# Patient Record
Sex: MALE | Race: WHITE | Employment: OTHER | URBAN - NONMETROPOLITAN AREA
[De-identification: names, ages, dates, MRNs, and addresses within clinical notes are randomized per-mention and may not be internally consistent; named-entity substitution may affect disease eponyms.]

---

## 2017-01-11 ENCOUNTER — OFFICE VISIT - GICH (OUTPATIENT)
Dept: INTERNAL MEDICINE | Facility: OTHER | Age: 82
End: 2017-01-11

## 2017-01-11 ENCOUNTER — HISTORY (OUTPATIENT)
Dept: INTERNAL MEDICINE | Facility: OTHER | Age: 82
End: 2017-01-11

## 2017-01-11 DIAGNOSIS — G30.1 ALZHEIMER'S DISEASE WITH LATE ONSET (CODE) (H): ICD-10-CM

## 2017-01-11 DIAGNOSIS — D62 ACUTE POSTHEMORRHAGIC ANEMIA: ICD-10-CM

## 2017-01-11 DIAGNOSIS — M1A.09X1 IDIOPATHIC CHRONIC GOUT OF MULTIPLE SITES WITH TOPHUS: ICD-10-CM

## 2017-01-11 DIAGNOSIS — F02.818 DEMENTIA IN OTHER DISEASES CLASSIFIED ELSEWHERE WITH BEHAVIORAL DISTURBANCE: ICD-10-CM

## 2017-01-11 LAB
HEMOGLOBIN: 13.4 G/DL (ref 13.5–17.5)
MCV RBC AUTO: 83 FL (ref 80–100)

## 2017-03-06 ENCOUNTER — COMMUNICATION - GICH (OUTPATIENT)
Dept: INTERNAL MEDICINE | Facility: OTHER | Age: 82
End: 2017-03-06

## 2017-03-06 DIAGNOSIS — H10.9 CONJUNCTIVITIS: ICD-10-CM

## 2017-04-14 ENCOUNTER — COMMUNICATION - GICH (OUTPATIENT)
Dept: INTERNAL MEDICINE | Facility: OTHER | Age: 82
End: 2017-04-14

## 2017-04-17 ENCOUNTER — HISTORY (OUTPATIENT)
Dept: INTERNAL MEDICINE | Facility: OTHER | Age: 82
End: 2017-04-17

## 2017-04-17 ENCOUNTER — OFFICE VISIT - GICH (OUTPATIENT)
Dept: INTERNAL MEDICINE | Facility: OTHER | Age: 82
End: 2017-04-17

## 2017-04-17 DIAGNOSIS — M1A.09X1 IDIOPATHIC CHRONIC GOUT OF MULTIPLE SITES WITH TOPHUS: ICD-10-CM

## 2017-05-08 ENCOUNTER — COMMUNICATION - GICH (OUTPATIENT)
Dept: INTERNAL MEDICINE | Facility: OTHER | Age: 82
End: 2017-05-08

## 2017-05-08 DIAGNOSIS — F02.818 DEMENTIA IN OTHER DISEASES CLASSIFIED ELSEWHERE WITH BEHAVIORAL DISTURBANCE: ICD-10-CM

## 2017-05-08 DIAGNOSIS — G30.1 ALZHEIMER'S DISEASE WITH LATE ONSET (CODE) (H): ICD-10-CM

## 2017-07-25 ENCOUNTER — AMBULATORY - GICH (OUTPATIENT)
Dept: SCHEDULING | Facility: OTHER | Age: 82
End: 2017-07-25

## 2017-07-27 ENCOUNTER — COMMUNICATION - GICH (OUTPATIENT)
Dept: INTERNAL MEDICINE | Facility: OTHER | Age: 82
End: 2017-07-27

## 2017-07-27 DIAGNOSIS — F02.818 DEMENTIA OF THE ALZHEIMER'S TYPE WITH EARLY ONSET WITH BEHAVIORAL DISTURBANCE (H): Primary | ICD-10-CM

## 2017-07-27 DIAGNOSIS — G47.00 INSOMNIA: ICD-10-CM

## 2017-07-27 DIAGNOSIS — F32.9 MAJOR DEPRESSIVE DISORDER, SINGLE EPISODE: ICD-10-CM

## 2017-07-27 DIAGNOSIS — G30.0 DEMENTIA OF THE ALZHEIMER'S TYPE WITH EARLY ONSET WITH BEHAVIORAL DISTURBANCE (H): Primary | ICD-10-CM

## 2017-07-31 NOTE — TELEPHONE ENCOUNTER
Risperdal  Last office visit: 4/15/16  Last refill: 6/27/16 #360, 1 R.  Last sig: Give 2 tablets twice a day by mouth.  May give an additional 1 pill as needed mid day or late evening if he is showing agitation and aggression.  New sig: Take 1 tablet twice daily.  Please clarify.  Thank you.

## 2017-08-02 RX ORDER — RISPERIDONE 0.5 MG/1
TABLET ORAL
Qty: 60 TABLET | Refills: 0 | Status: SHIPPED | OUTPATIENT
Start: 2017-08-02 | End: 2017-08-24

## 2017-08-24 DIAGNOSIS — F02.818 DEMENTIA OF THE ALZHEIMER'S TYPE WITH EARLY ONSET WITH BEHAVIORAL DISTURBANCE (H): ICD-10-CM

## 2017-08-24 DIAGNOSIS — G30.0 DEMENTIA OF THE ALZHEIMER'S TYPE WITH EARLY ONSET WITH BEHAVIORAL DISTURBANCE (H): ICD-10-CM

## 2017-08-24 RX ORDER — RISPERIDONE 0.5 MG/1
TABLET ORAL
Qty: 60 TABLET | Refills: 0 | Status: SHIPPED | OUTPATIENT
Start: 2017-08-24 | End: 2018-01-01

## 2017-09-11 ENCOUNTER — OFFICE VISIT - GICH (OUTPATIENT)
Dept: INTERNAL MEDICINE | Facility: OTHER | Age: 82
End: 2017-09-11

## 2017-09-11 ENCOUNTER — HISTORY (OUTPATIENT)
Dept: INTERNAL MEDICINE | Facility: OTHER | Age: 82
End: 2017-09-11

## 2017-09-11 DIAGNOSIS — G47.00 INSOMNIA: ICD-10-CM

## 2017-09-11 DIAGNOSIS — M1A.09X1 IDIOPATHIC CHRONIC GOUT OF MULTIPLE SITES WITH TOPHUS: ICD-10-CM

## 2017-09-11 DIAGNOSIS — G30.1 ALZHEIMER'S DISEASE WITH LATE ONSET (CODE) (H): ICD-10-CM

## 2017-09-11 DIAGNOSIS — F32.9 MAJOR DEPRESSIVE DISORDER, SINGLE EPISODE: ICD-10-CM

## 2017-09-11 DIAGNOSIS — F02.818 DEMENTIA IN OTHER DISEASES CLASSIFIED ELSEWHERE WITH BEHAVIORAL DISTURBANCE: ICD-10-CM

## 2017-09-11 DIAGNOSIS — K92.2 GASTROINTESTINAL HEMORRHAGE: ICD-10-CM

## 2017-09-11 LAB
ANION GAP - HISTORICAL: 14 (ref 5–18)
BUN SERPL-MCNC: 17 MG/DL (ref 7–25)
BUN/CREAT RATIO - HISTORICAL: 15
CALCIUM SERPL-MCNC: 8.8 MG/DL (ref 8.6–10.3)
CHLORIDE SERPLBLD-SCNC: 103 MMOL/L (ref 98–107)
CO2 SERPL-SCNC: 22 MMOL/L (ref 21–31)
CREAT SERPL-MCNC: 1.13 MG/DL (ref 0.7–1.3)
GFR IF NOT AFRICAN AMERICAN - HISTORICAL: >60 ML/MIN/1.73M2
GLUCOSE SERPL-MCNC: 85 MG/DL (ref 70–105)
HEMOGLOBIN: 12.1 G/DL (ref 13.5–17.5)
MCV RBC AUTO: 90 FL (ref 80–100)
POTASSIUM SERPL-SCNC: 4.2 MMOL/L (ref 3.5–5.1)
SODIUM SERPL-SCNC: 139 MMOL/L (ref 133–143)

## 2017-09-25 ENCOUNTER — AMBULATORY - GICH (OUTPATIENT)
Dept: INTERNAL MEDICINE | Facility: OTHER | Age: 82
End: 2017-09-25

## 2017-09-25 DIAGNOSIS — B35.6 TINEA CRURIS: ICD-10-CM

## 2017-10-23 ENCOUNTER — COMMUNICATION - GICH (OUTPATIENT)
Dept: INTERNAL MEDICINE | Facility: OTHER | Age: 82
End: 2017-10-23

## 2017-10-23 DIAGNOSIS — M10.062: ICD-10-CM

## 2017-11-15 ENCOUNTER — HISTORY (OUTPATIENT)
Dept: INTERNAL MEDICINE | Facility: OTHER | Age: 82
End: 2017-11-15

## 2017-11-15 ENCOUNTER — OFFICE VISIT - GICH (OUTPATIENT)
Dept: INTERNAL MEDICINE | Facility: OTHER | Age: 82
End: 2017-11-15

## 2017-11-15 DIAGNOSIS — N48.1 BALANITIS: ICD-10-CM

## 2017-11-15 DIAGNOSIS — G47.00 INSOMNIA: ICD-10-CM

## 2017-12-05 ENCOUNTER — COMMUNICATION - GICH (OUTPATIENT)
Dept: INTERNAL MEDICINE | Facility: OTHER | Age: 82
End: 2017-12-05

## 2017-12-05 DIAGNOSIS — G30.1 ALZHEIMER'S DISEASE WITH LATE ONSET (CODE) (H): ICD-10-CM

## 2017-12-05 DIAGNOSIS — F02.818 DEMENTIA IN OTHER DISEASES CLASSIFIED ELSEWHERE WITH BEHAVIORAL DISTURBANCE: ICD-10-CM

## 2017-12-19 ENCOUNTER — COMMUNICATION - GICH (OUTPATIENT)
Dept: INTERNAL MEDICINE | Facility: OTHER | Age: 82
End: 2017-12-19

## 2017-12-19 DIAGNOSIS — G30.1 ALZHEIMER'S DISEASE WITH LATE ONSET (CODE) (H): ICD-10-CM

## 2017-12-19 DIAGNOSIS — F02.818 DEMENTIA IN OTHER DISEASES CLASSIFIED ELSEWHERE WITH BEHAVIORAL DISTURBANCE: ICD-10-CM

## 2017-12-20 ENCOUNTER — COMMUNICATION - GICH (OUTPATIENT)
Dept: INTERNAL MEDICINE | Facility: OTHER | Age: 82
End: 2017-12-20

## 2017-12-26 ENCOUNTER — AMBULATORY - GICH (OUTPATIENT)
Dept: SCHEDULING | Facility: OTHER | Age: 82
End: 2017-12-26

## 2017-12-28 NOTE — PROGRESS NOTES
Patient Information     Patient Name MRN Sex Angelo Rowland [id number] Male 3/6/1928      Progress Notes by Chip Garcia MD at 11/15/2017  4:00 PM     Author:  Chip Garcia MD Service:  (none) Author Type:  Physician     Filed:  11/15/2017  4:29 PM Encounter Date:  11/15/2017 Status:  Signed     :  Chip Garcia MD (Physician)            SUBJECTIVE:    Angelo Santizo is a 89 y.o. male who presents for trouble sleeping and trouble with probable yeast infection.    HPI Comments: He is in today from his assisted living. He has irritation at the end of his penis. He did have a groin infection consistent with tinea and they have been using nystatin powder on that area. He doesn't offer any real complaints of this. He is uncircumcised.    He has also been up and wandering quite a bit since his wife  a couple of weeks ago. Daughter brings him in today and wonders about other sleep aids that are safe. We talked about various options. She is interested in trying melatonin which seems reasonable.      Allergies     Allergen  Reactions     Nsaids (Non-Steroidal Anti-Inflammatory Drug) GI Bleeding   ,   Current Outpatient Prescriptions     Medication  Sig     acetaminophen (TYLENOL) 325 mg tablet Take 1-2 tablets by mouth every 6 hours if needed (PAIN). Max acetaminophen dose: 4000mg in 24 hrs.     allopurinol (ZYLOPRIM) 100 mg tablet Take 1 tablet by mouth once daily.     colchicine 0.6 mg tablet Take twice a day as needed for gout     melatonin 3 mg tablet Take 1 tablet by mouth at bedtime.     Nystatin, Topical, 1 billion unit powd As directed 3 times daily if needed for Other (Specify) (Rash).     risperiDONE (RISPERDAL) 0.25 mg tablet TAKE 1 TAB BY MOUTH EVERY 8 HOURS AS NEEDED FOR AGITATION     risperiDONE (RISPERDAL) 0.5 mg tablet Take 1 tablet by mouth 2 times daily.     sertraline (ZOLOFT) 50 mg tablet Take 1.5 tablets by mouth once daily.     traZODone (DESYREL) 100 mg tablet Take 1 tablet by mouth  at bedtime.     No current facility-administered medications for this visit.      Medications have been reviewed by me and are current to the best of my knowledge and ability. ,   Past Medical History:     Diagnosis  Date     Alzheimer's dementia      Depression      GI bleed due to NSAIDs      Gout      Insomnia     and   Patient Active Problem List      Diagnosis Date Noted     Chronic idiopathic gout of multiple sites 10/04/2016     Acute upper GI bleed 09/02/2016     Alzheimer's dementia      Depression      Insomnia        REVIEW OF SYSTEMS:  Review of Systems   All other systems reviewed and are negative.      OBJECTIVE:  /70  Pulse 64  Temp 97.6  F (36.4  C) (Tympanic)  Wt 90.3 kg (199 lb)  BMI 29.39 kg/m2    EXAM:   Physical Exam   Constitutional: He is well-developed, well-nourished, and in no distress. No distress.   Genitourinary:   Genitourinary Comments: Uncircumcised. Retraction of the foreskin reveals erythema and moist skin drainage consistent with balanitis   Skin: Skin is warm and dry. He is not diaphoretic.   Nursing note and vitals reviewed.      ASSESSMENT/PLAN:    ICD-10-CM    1. Balanitis N48.1    2. Insomnia, unspecified type G47.00 melatonin 3 mg tablet        Plan:  They will use the nystatin powder that they have 3 times a day. Foreskin needs to be retracted when this is used. Notify if not improving. Trial of melatonin for sleep at 3 mg daily at bedtime. We talked about other medications as well but want to avoid anything which might cause sedation or side effects. Follow-up will be dependent on how things go.

## 2017-12-28 NOTE — TELEPHONE ENCOUNTER
Patient Information     Patient Name MRN Angelo Natarajan [id number] Male 3/6/1928      Telephone Encounter by Cydney Roberson RN at 10/23/2017  2:46 PM     Author:  Cydney Roberson RN Service:  (none) Author Type:  NURS- Registered Nurse     Filed:  10/23/2017  2:50 PM Encounter Date:  10/23/2017 Status:  Signed     :  Cydney Roberson RN (NURS- Registered Nurse)            Tylenol  Office visit in the past 12 months or per provider note.    Last visit with CHULA BELCHER was on: 2017 in [facility]  Next visit with CHULA BELCHER is on: No future appointment listed with this provider  Next visit with Internal Medicine is on: No future appointment listed in this department    Max refill for 12 months from last office visit or per provider note.  Prescription refilled per RN Medication Refill Policy.................... CYDNEY ROBERSON RN ....................  10/23/2017   2:48 PM

## 2017-12-28 NOTE — PROGRESS NOTES
Patient Information     Patient Name MRN Sex Angelo Rowland [id number] Male 3/6/1928      Progress Notes by Chip Garcia MD at 2017 12:40 PM     Author:  Chip Garcia MD Service:  (none) Author Type:  Physician     Filed:  2017  1:32 PM Encounter Date:  2017 Status:  Signed     :  Chip Garcia MD (Physician)            SUBJECTIVE:    Angelo Santizo is a 89 y.o. male who presents for comprehensive review of their multiple medical problems and review of medications, renewal of medications and update on necessary health maintenance issues.      HPI Comments: This patient comes in today for a yearly review. He has had a gentle decline in his mental functioning over the last year. He does have dementia. He is brought in today by his daughter who has joint power of  with his son lives in [place]. She states that the patient has been relatively stable other than his overall decline, as to be expected considering his diagnosis of dementia. His behaviors have been reasonable. His sleep has been reasonable. His gait is getting a little bit more unsteady. He has developed some urinary incontinence. His gout has not been terribly problematic, he takes colchicine on an as-needed basis for that.    He was hospitalized earlier within the acute upper GI bleed. This was treated and everything has been stable since. It's been a while since he had a hemoglobin so I recommended that we do some basic lab work, patient's daughter is in agreement with this. Otherwise no other complaints or concerns. Medications are reconciled. Past medical history, past surgical history, family history and social history are all updated today.      Allergies     Allergen  Reactions     Nsaids (Non-Steroidal Anti-Inflammatory Drug) GI Bleeding   ,   Current Outpatient Prescriptions     Medication  Sig     acetaminophen (TYLENOL) 325 mg tablet Take 1-2 tablets by mouth every 6 hours if needed (PAIN). Max  acetaminophen dose: 4000mg in 24 hrs.     allopurinol (ZYLOPRIM) 100 mg tablet Take 1 tablet by mouth once daily.     colchicine 0.6 mg tablet Take twice a day as needed for gout     risperiDONE (RISPERDAL) 0.25 mg tablet TAKE 1 TAB BY MOUTH EVERY 8 HOURS AS NEEDED FOR AGITATION     risperiDONE (RISPERDAL) 0.5 mg tablet Take 1 tablet by mouth 2 times daily.     sertraline (ZOLOFT) 50 mg tablet Take 1.5 tablets by mouth once daily.     traZODone (DESYREL) 100 mg tablet Take 1 tablet by mouth at bedtime.     No current facility-administered medications for this visit.      Medications have been reviewed by me and are current to the best of my knowledge and ability. ,   Past Medical History:     Diagnosis  Date     Alzheimer's dementia      Depression      GI bleed due to NSAIDs      Gout      Insomnia    ,   Patient Active Problem List      Diagnosis Date Noted     Chronic idiopathic gout of multiple sites 10/04/2016     Acute upper GI bleed 2016     Alzheimer's dementia      Depression      Insomnia    , No past surgical history on file. and   Social History      Substance Use Topics        Smoking status:  Former Smoker     Types: Cigarettes     Quit date: 1985     Smokeless tobacco:  Former User     Types: Chew     Quit date: 2016     Alcohol use  No     Family Status     Relation  Status     Brother Alive    Lewy body dementia      Other     10-12 others       Mother      Father      Social History     Social History        Marital status:       Spouse name: N/A     Number of children:  N/A     Years of education:  N/A     Social History Main Topics        Smoking status:  Former Smoker     Types: Cigarettes     Quit date: 1985     Smokeless tobacco:  Former User     Types: Chew     Quit date: 2016     Alcohol use  No     Drug use:  No     Sexual activity:  Not Asked     Other Topics  Concern     None      Social History Narrative     , retired  "from iron work, 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  Moved to memory care at [facility] with wife.  From [place] 2016.       REVIEW OF SYSTEMS:  Review of Systems   Constitutional: Negative for chills, diaphoresis, fever, malaise/fatigue and weight loss.   HENT: Negative for congestion, ear pain, nosebleeds, sore throat and tinnitus.    Eyes: Negative for blurred vision, double vision, photophobia, pain, discharge and redness.   Respiratory: Negative for cough, hemoptysis, sputum production, shortness of breath and wheezing.    Cardiovascular: Negative for chest pain, palpitations, orthopnea, claudication, leg swelling and PND.   Gastrointestinal: Negative for abdominal pain, blood in stool, constipation, diarrhea, heartburn, nausea and vomiting.   Genitourinary: Negative for dysuria, flank pain and hematuria.   Musculoskeletal: Negative for back pain, joint pain, myalgias and neck pain.   Skin: Negative for itching and rash.   Neurological: Negative for dizziness, tingling, tremors, speech change, loss of consciousness, weakness and headaches.   Psychiatric/Behavioral: Positive for memory loss. Negative for depression, hallucinations, substance abuse and suicidal ideas. The patient has insomnia. The patient is not nervous/anxious.        OBJECTIVE:  /62  Pulse 76  Ht 1.753 m (5' 9\")  Wt 89.8 kg (198 lb)  BMI 29.24 kg/m2    EXAM:   Physical Exam   Constitutional: He is well-developed, well-nourished, and in no distress. No distress.   HENT:   Head: Normocephalic.   Right Ear: External ear normal.   Left Ear: External ear normal.   Mouth/Throat: Oropharynx is clear and moist. No oropharyngeal exudate.   Eyes: Pupils are equal, round, and reactive to light.   Neck: Normal range of motion. Neck supple. No JVD present. No tracheal deviation present. No thyromegaly present.   Cardiovascular: Normal rate and regular rhythm.    Murmur heard.   Systolic murmur is present with a grade of 2/6   RUSB "   Pulmonary/Chest: Effort normal and breath sounds normal. No respiratory distress. He has no wheezes. He has no rales.   Abdominal: Soft. Bowel sounds are normal. He exhibits no distension and no mass. There is no tenderness. There is no rebound and no guarding.   Musculoskeletal: He exhibits edema.   2+ right, 1+ left   Lymphadenopathy:     He has no cervical adenopathy.   Neurological: He is alert.   Skin: Skin is warm and dry. He is not diaphoretic.   Psychiatric:   Significant dementia, pleasant   Nursing note and vitals reviewed.      ASSESSMENT/PLAN:    ICD-10-CM    1. Late onset Alzheimer's disease with behavioral disturbance G30.1 risperiDONE (RISPERDAL) 0.5 mg tablet     F02.81 DISCONTINUED: risperiDONE (RISPERDAL) 0.5 mg tablet   2. Insomnia, unspecified type G47.00 traZODone (DESYREL) 100 mg tablet   3. Idiopathic chronic gout of multiple sites with tophus M1A.09X1 BASIC METABOLIC PANEL      allopurinol (ZYLOPRIM) 100 mg tablet      BASIC METABOLIC PANEL   4. Acute upper GI bleed K92.2 HEMOGLOBIN      HEMOGLOBIN   5. Depression, unspecified depression type F32.9 sertraline (ZOLOFT) 50 mg tablet        Plan: Overall, he seems to be doing fine for his age and his medical diagnoses. Obviously his bench is progressive but for the present time everything seems to be fine and stable so no further changes or additions to medications will be done. All other health measures are basically up-to-date given his age and state of health. Basic lab drawn today and pending, I will send a letter with the results. Follow-up will be on an as-needed basis. Medications are refilled today without change.

## 2017-12-28 NOTE — TELEPHONE ENCOUNTER
Patient Information     Patient Name MRN Sex Angelo Rowland [id number] Male 3/6/1928      Telephone Encounter by Jason Kearns RN at 2017  8:11 AM     Author:  Jason Kearns RN Service:  (none) Author Type:  NURS- Registered Nurse     Filed:  2017  8:13 AM Encounter Date:  2017 Status:  Signed     :  Jason Kearns RN (NURS- Registered Nurse)            Refilled 17.  Unable to complete prescription refill per RN Medication Refill Policy.................... JASON KEARNS RN ....................  2017   8:11 AM

## 2017-12-30 NOTE — NURSING NOTE
Patient Information     Patient Name MRN Sex Angelo Rowland [id number] Male 3/6/1928      Nursing Note by Carole Keen LPN at 2017 12:40 PM     Author:  Carole Keen LPN Service:  (none) Author Type:  NURS- Licensed Practical Nurse     Filed:  2017  1:04 PM Encounter Date:  2017 Status:  Signed     :  Carole Keen LPN (NURS- Licensed Practical Nurse)            The patient is here today to have a annual check up. The patient has not been to the eye doctor in the last year.  Carole Keen LPN......2017  12:59 PM

## 2017-12-30 NOTE — NURSING NOTE
Patient Information     Patient Name MRN Angelo Natarajan [id number] Male 3/6/1928      Nursing Note by Carole Keen LPN at 11/15/2017  4:00 PM     Author:  Carole Keen LPN Service:  (none) Author Type:  NURS- Licensed Practical Nurse     Filed:  11/15/2017  4:12 PM Encounter Date:  11/15/2017 Status:  Signed     :  Carole Keen LPN (NURS- Licensed Practical Nurse)            The patient is here today to seek some kind of sleep aide, as well as the patient recently had discharge, redness and irritation at the tip of his penis.  Caorle Keen LPN......11/15/2017  4:09 PM

## 2018-01-01 ENCOUNTER — OFFICE VISIT (OUTPATIENT)
Dept: INTERNAL MEDICINE | Facility: OTHER | Age: 83
End: 2018-01-01
Attending: INTERNAL MEDICINE
Payer: MEDICARE

## 2018-01-01 ENCOUNTER — TELEPHONE (OUTPATIENT)
Dept: FAMILY MEDICINE | Facility: OTHER | Age: 83
End: 2018-01-01

## 2018-01-01 ENCOUNTER — TELEPHONE (OUTPATIENT)
Dept: INTERNAL MEDICINE | Facility: OTHER | Age: 83
End: 2018-01-01

## 2018-01-01 VITALS
BODY MASS INDEX: 29.98 KG/M2 | HEART RATE: 72 BPM | DIASTOLIC BLOOD PRESSURE: 64 MMHG | WEIGHT: 203 LBS | SYSTOLIC BLOOD PRESSURE: 116 MMHG

## 2018-01-01 VITALS
DIASTOLIC BLOOD PRESSURE: 74 MMHG | BODY MASS INDEX: 29.68 KG/M2 | HEART RATE: 68 BPM | WEIGHT: 201 LBS | SYSTOLIC BLOOD PRESSURE: 132 MMHG

## 2018-01-01 DIAGNOSIS — F03.918 SENILE DEMENTIA, WITH BEHAVIORAL DISTURBANCE (H): ICD-10-CM

## 2018-01-01 DIAGNOSIS — B35.6 TINEA CRURIS: ICD-10-CM

## 2018-01-01 DIAGNOSIS — F02.80 LATE ONSET ALZHEIMER'S DISEASE WITHOUT BEHAVIORAL DISTURBANCE (H): ICD-10-CM

## 2018-01-01 DIAGNOSIS — G47.00 INSOMNIA, UNSPECIFIED TYPE: Primary | ICD-10-CM

## 2018-01-01 DIAGNOSIS — F03.918 SENILE DEMENTIA, WITH BEHAVIORAL DISTURBANCE (H): Primary | ICD-10-CM

## 2018-01-01 DIAGNOSIS — F32.A DEPRESSION, UNSPECIFIED DEPRESSION TYPE: ICD-10-CM

## 2018-01-01 DIAGNOSIS — F02.80 ALZHEIMER'S DEMENTIA (H): ICD-10-CM

## 2018-01-01 DIAGNOSIS — G30.9 ALZHEIMER'S DEMENTIA (H): ICD-10-CM

## 2018-01-01 DIAGNOSIS — F02.80 LATE ONSET ALZHEIMER'S DISEASE WITHOUT BEHAVIORAL DISTURBANCE (H): Primary | ICD-10-CM

## 2018-01-01 DIAGNOSIS — R30.0 DYSURIA: Primary | ICD-10-CM

## 2018-01-01 DIAGNOSIS — F02.818 LATE ONSET ALZHEIMER'S DISEASE WITH BEHAVIORAL DISTURBANCE (H): ICD-10-CM

## 2018-01-01 DIAGNOSIS — M1A.09X0 IDIOPATHIC CHRONIC GOUT OF MULTIPLE SITES WITHOUT TOPHUS: ICD-10-CM

## 2018-01-01 DIAGNOSIS — G30.1 LATE ONSET ALZHEIMER'S DISEASE WITH BEHAVIORAL DISTURBANCE (H): ICD-10-CM

## 2018-01-01 DIAGNOSIS — F51.01 PRIMARY INSOMNIA: ICD-10-CM

## 2018-01-01 DIAGNOSIS — G30.1 LATE ONSET ALZHEIMER'S DISEASE WITHOUT BEHAVIORAL DISTURBANCE (H): Primary | ICD-10-CM

## 2018-01-01 DIAGNOSIS — R60.0 PERIPHERAL EDEMA: Primary | ICD-10-CM

## 2018-01-01 DIAGNOSIS — G30.1 LATE ONSET ALZHEIMER'S DISEASE WITHOUT BEHAVIORAL DISTURBANCE (H): ICD-10-CM

## 2018-01-01 LAB
ALBUMIN UR-MCNC: NEGATIVE MG/DL
APPEARANCE UR: CLEAR
BILIRUB UR QL STRIP: NEGATIVE
COLOR UR AUTO: YELLOW
GLUCOSE UR STRIP-MCNC: NEGATIVE MG/DL
HGB UR QL STRIP: NEGATIVE
KETONES UR STRIP-MCNC: NEGATIVE MG/DL
LEUKOCYTE ESTERASE UR QL STRIP: NEGATIVE
NITRATE UR QL: NEGATIVE
PH UR STRIP: 6 PH (ref 5–9)
SOURCE: NORMAL
SP GR UR STRIP: 1.02 (ref 1–1.03)
UROBILINOGEN UR STRIP-ACNC: 0.2 EU/DL (ref 0.2–1)

## 2018-01-01 PROCEDURE — G0463 HOSPITAL OUTPT CLINIC VISIT: HCPCS

## 2018-01-01 PROCEDURE — 99215 OFFICE O/P EST HI 40 MIN: CPT | Performed by: INTERNAL MEDICINE

## 2018-01-01 PROCEDURE — 99213 OFFICE O/P EST LOW 20 MIN: CPT | Performed by: INTERNAL MEDICINE

## 2018-01-01 PROCEDURE — 81003 URINALYSIS AUTO W/O SCOPE: CPT | Performed by: INTERNAL MEDICINE

## 2018-01-01 RX ORDER — RISPERIDONE 0.5 MG/1
TABLET ORAL
Qty: 270 TABLET | Refills: 1 | Status: SHIPPED | OUTPATIENT
Start: 2018-01-01 | End: 2018-01-01

## 2018-01-01 RX ORDER — RISPERIDONE 0.25 MG/1
0.5 TABLET ORAL EVERY 6 HOURS PRN
Qty: 60 TABLET | Refills: 5 | Status: SHIPPED | OUTPATIENT
Start: 2018-01-01 | End: 2019-01-01

## 2018-01-01 RX ORDER — RISPERIDONE 0.5 MG/1
TABLET ORAL
Qty: 180 TABLET | Refills: 1 | Status: SHIPPED | OUTPATIENT
Start: 2018-01-01 | End: 2018-01-01

## 2018-01-01 RX ORDER — FUROSEMIDE 20 MG
20 TABLET ORAL DAILY
Qty: 30 TABLET | Refills: 11 | Status: SHIPPED | OUTPATIENT
Start: 2018-01-01

## 2018-01-01 RX ORDER — MICONAZOLE NITRATE
POWDER (GRAM) MISCELLANEOUS 2 TIMES DAILY PRN
Qty: 100 G | Refills: 3 | Status: SHIPPED | OUTPATIENT
Start: 2018-01-01 | End: 2019-01-01

## 2018-01-01 RX ORDER — TRAZODONE HYDROCHLORIDE 100 MG/1
TABLET ORAL
Qty: 90 TABLET | Refills: 2 | Status: SHIPPED | OUTPATIENT
Start: 2018-01-01 | End: 2019-01-01

## 2018-01-01 RX ORDER — RISPERIDONE 0.5 MG/1
TABLET ORAL
Qty: 180 TABLET | Refills: 1 | COMMUNITY
Start: 2018-01-01 | End: 2019-01-01

## 2018-01-01 RX ORDER — RISPERIDONE 0.5 MG/1
TABLET ORAL
Qty: 1 TABLET | OUTPATIENT
Start: 2018-01-01

## 2018-01-01 RX ORDER — RISPERIDONE 0.5 MG/1
TABLET ORAL
Qty: 180 TABLET | Refills: 1 | COMMUNITY
Start: 2018-01-01 | End: 2018-01-01

## 2018-01-01 RX ORDER — LANOLIN ALCOHOL/MO/W.PET/CERES
CREAM (GRAM) TOPICAL
Qty: 100 TABLET | Refills: 3 | Status: SHIPPED | OUTPATIENT
Start: 2018-01-01 | End: 2019-01-01

## 2018-01-01 RX ORDER — RISPERIDONE 0.5 MG/1
TABLET ORAL
Qty: 60 TABLET | Refills: 0 | Status: SHIPPED | OUTPATIENT
Start: 2018-01-01 | End: 2018-01-01

## 2018-01-01 ASSESSMENT — ENCOUNTER SYMPTOMS
ENDOCRINE NEGATIVE: 1
ALLERGIC/IMMUNOLOGIC NEGATIVE: 1
CONSTITUTIONAL NEGATIVE: 1
EYES NEGATIVE: 1

## 2018-01-01 ASSESSMENT — PATIENT HEALTH QUESTIONNAIRE - PHQ9
SUM OF ALL RESPONSES TO PHQ QUESTIONS 1-9: 0
SUM OF ALL RESPONSES TO PHQ QUESTIONS 1-9: 0

## 2018-01-02 ENCOUNTER — AMBULATORY - GICH (OUTPATIENT)
Dept: INTERNAL MEDICINE | Facility: OTHER | Age: 83
End: 2018-01-02

## 2018-01-02 DIAGNOSIS — M62.81 MUSCLE WEAKNESS (GENERALIZED): ICD-10-CM

## 2018-01-02 NOTE — NURSING NOTE
Patient Information     Patient Name MRN Sex Angelo Rowland [id number] Male 3/6/1928      Nursing Note by Carole Keen LPN at 2017 12:50 PM     Author:  Carole Keen LPN Service:  (none) Author Type:  NURS- Licensed Practical Nurse     Filed:  2017  1:13 PM Encounter Date:  2017 Status:  Signed     :  Carole Keen LPN (NURS- Licensed Practical Nurse)            The patient is here today to have a follow up visit.  Carole Keen LPN......2017  1:08 PM

## 2018-01-03 NOTE — TELEPHONE ENCOUNTER
Patient Information     Patient Name MRN Sex Angelo Rowland [id number] Male 3/6/1928      Telephone Encounter by Carole Keen LPN at 3/6/2017 10:31 AM     Author:  Carole Keen LPN Service:  (none) Author Type:  NURS- Licensed Practical Nurse     Filed:  3/6/2017 10:33 AM Encounter Date:  3/6/2017 Status:  Signed     :  Carole Keen LPN (NURS- Licensed Practical Nurse)            Contacted jose barclay and gave sissy the information below.  Carole Keen LPN......3/6/2017  10:32 AM

## 2018-01-03 NOTE — PROGRESS NOTES
Patient Information     Patient Name MRN Sex Angelo Rowland [id number] Male 3/6/1928      Progress Notes by Chip Garcia MD at 2017 12:50 PM     Author:  Chip Garcia MD Service:  (none) Author Type:  Physician     Filed:  2017  2:06 PM Encounter Date:  2017 Status:  Signed     :  Chip Garcia MD (Physician)            SUBJECTIVE:    Angelo Santizo is a 88 y.o. male who presents for a recheck on his hemoglobin.    HPI Comments: This patient is here for follow-up evaluation. He has a history of idiopathic gout. He was treated at one point in time with anti-inflammatory medications. This resulted in an upper GI bleed with acute blood loss anemia. Since that time, he has been on Protonix and Carafate as well as iron. He's here today to have his hemoglobin rechecked. He denies any GI symptoms or problems. His daughter is in today and states that he seems to be doing quite well.    He does have some occasional gouty complaints especially in his toes. The last time we looked at his toe he had a little bit of an ulcer on it. He does have a when necessary colchicine order on his medication list to use for gout. If it ever gets substantial, we will use prednisone. He should not be taking anti-inflammatory medications again.      Allergies     Allergen  Reactions     Nsaids (Non-Steroidal Anti-Inflammatory Drug) GI Bleeding   ,   Current Outpatient Prescriptions     Medication  Sig     acetaminophen (TYLENOL) 325 mg tablet Take 1-2 tablets by mouth every 6 hours if needed (PAIN). Max acetaminophen dose: 4000mg in 24 hrs.     CARAFATE 100 mg/mL suspension TAKE 10 mLS BY MOUTH FOUR TIMES DAILY BEFORE MEALS AND AT BEDTIME CORWIN EMPTY STOMACH. TAKE FOR 6 WEEKS     colchicine 0.6 mg tablet Take twice a day as needed for gout     ferrous sulfate, 65 mg elemental, tablet TAKE 1 TABLET BY MOUTH TWIC E A DAY WITH MEALS     pantoprazole (PROTONIX) 40 mg delayed-release tablet TAKE 1 TABLET BY MOUTH ONCE  DAILY FOR 6 WEEKS     risperiDONE (RISPERDAL) 0.25 mg tablet Take 0.25 mg by mouth every 8 hours if needed for Agitation.     risperiDONE (RISPERDAL) 0.25 mg tablet Take 2 tablets by mouth 2 times daily.     sertraline (ZOLOFT) 50 mg tablet Take 1.5 tablets by mouth once daily.     traZODone (DESYREL) 100 mg tablet Take 1 tablet by mouth at bedtime.     No current facility-administered medications for this visit.      Medications have been reviewed by me and are current to the best of my knowledge and ability. ,   Past Medical History     Diagnosis  Date     Alzheimer's dementia      Depression      GI bleed due to NSAIDs      Gout      Insomnia    ,   Patient Active Problem List      Diagnosis Date Noted     Chronic idiopathic gout of multiple sites 10/04/2016     Acute blood loss anemia 09/02/2016     Orthostatic hypotension 09/02/2016     Acute upper GI bleed 09/02/2016     Ulcer of toe of left foot (HC) 08/09/2016     Alzheimer's dementia      Depression      Insomnia    , No past surgical history on file. and   Social History      Substance Use Topics        Smoking status:  Former Smoker     Types: Cigarettes     Quit date: 8/9/1985     Smokeless tobacco:  Former User     Types: Chew     Quit date: 5/9/2016     Alcohol use  No       REVIEW OF SYSTEMS:  Review of Systems   All other systems reviewed and are negative.      OBJECTIVE:  /66  Pulse 84  Wt 90.7 kg (200 lb)  BMI 29.53 kg/m2    EXAM:   Physical Exam   Constitutional: He is well-developed, well-nourished, and in no distress.   Musculoskeletal:   His left first MTP joint again had some mild inflammation and induration. There was a Band-Aid over a very small superficial skin wound. This all looked quite benign. He denied any pain associated with this.   Skin: Skin is warm and dry. He is not diaphoretic. No pallor.   Psychiatric:   Pleasantly confused   Nursing note and vitals reviewed.      ASSESSMENT/PLAN:    ICD-10-CM    1. Acute blood loss  anemia D62 HEMOGLOBIN      HEMOGLOBIN   2. Idiopathic chronic gout of multiple sites with tophus M1A.09X1    3. Late onset Alzheimer's disease with behavioral disturbance G30.1      F02.81         Plan:  His hemoglobin is now essentially normal. I will have him discontinue the Carafate, Protonix and iron. We will follow this on an as-needed basis. All other medications will continue without change. Continue with Band-Aid dressing changes on the toe, follow-up only if this worsens. No regularly set return appointment is made today, we will have him back depending on how he does.

## 2018-01-03 NOTE — TELEPHONE ENCOUNTER
Patient Information     Patient Name MRN Sex Angelo Rowland [id number] Male 3/6/1928      Telephone Encounter by Carole Keen LPN at 3/6/2017  9:08 AM     Author:  Carole Keen LPN Service:  (none) Author Type:  NURS- Licensed Practical Nurse     Filed:  3/6/2017  9:12 AM Encounter Date:  3/6/2017 Status:  Signed     :  Carole Keen LPN (NURS- Licensed Practical Nurse)            Sis at [facility] called and stated the patient has really red eyes with green drainage and are swollen. They think the patient may have pink eye and is wondering if something could be sent in to the pharmacy for the patient.  Carole Keen LPN......3/6/2017  9:12 AM

## 2018-01-04 NOTE — PROGRESS NOTES
Patient Information     Patient Name MRN Sex Angelo Rowland [id number] Male 3/6/1928      Progress Notes by Chip Garcia MD at 2017  3:20 PM     Author:  Chip Garcia MD Service:  (none) Author Type:  Physician     Filed:  2017  4:05 PM Encounter Date:  2017 Status:  Signed     :  Chip Garcia MD (Physician)            SUBJECTIVE:    Angelo Santizo is a 89 y.o. male who presents for recheck on gout.    HPI Comments: He is brought in today with a complaint of gout. This is in his right ankle. Previous uric acid levels have been normal, daughter did not want him on daily therapy if not necessary. Anti-inflammatories have caused an upper GI bleed. Colchicine is being used on an as-needed basis currently but it doesn't seem to be completely effective for this episode.      Allergies     Allergen  Reactions     Nsaids (Non-Steroidal Anti-Inflammatory Drug) GI Bleeding   ,   Current Outpatient Prescriptions     Medication  Sig     acetaminophen (TYLENOL) 325 mg tablet Take 1-2 tablets by mouth every 6 hours if needed (PAIN). Max acetaminophen dose: 4000mg in 24 hrs.     allopurinol (ZYLOPRIM) 100 mg tablet Take 1 tablet by mouth once daily.     colchicine 0.6 mg tablet Take twice a day as needed for gout     predniSONE (DELTASONE) 10 mg tablet 2 daily for 4 days, 1 daily for 4 days then stop     risperiDONE (RISPERDAL) 0.25 mg tablet Take 0.25 mg by mouth every 8 hours if needed for Agitation.     risperiDONE (RISPERDAL) 0.25 mg tablet Take 2 tablets by mouth 2 times daily.     sertraline (ZOLOFT) 50 mg tablet Take 1.5 tablets by mouth once daily.     traZODone (DESYREL) 100 mg tablet Take 1 tablet by mouth at bedtime.     trimethoprim-polymyxin b (POLYTRIM) ophthalmic solution Place 1 Drop into both eyes every 4 hours. Use until clear     No current facility-administered medications for this visit.      Medications have been reviewed by me and are current to the best of my knowledge and  ability. ,   Past Medical History:     Diagnosis  Date     Alzheimer's dementia      Depression      GI bleed due to NSAIDs      Gout      Insomnia     and   Patient Active Problem List      Diagnosis Date Noted     Chronic idiopathic gout of multiple sites 10/04/2016     Acute blood loss anemia 09/02/2016     Orthostatic hypotension 09/02/2016     Acute upper GI bleed 09/02/2016     Ulcer of toe of left foot (HC) 08/09/2016     Alzheimer's dementia      Depression      Insomnia        REVIEW OF SYSTEMS:  ROS    OBJECTIVE:  /66  Pulse 72  Wt 89.8 kg (198 lb)  BMI 29.24 kg/m2    EXAM:   Physical Exam   Constitutional: He is well-developed, well-nourished, and in no distress. No distress.   Musculoskeletal:   He has some synovitis with edema and subjective tenderness around the right ankle   Skin: He is not diaphoretic.   Nursing note and vitals reviewed.      ASSESSMENT/PLAN:    ICD-10-CM    1. Idiopathic chronic gout of multiple sites with tophus M1A.09X1 predniSONE (DELTASONE) 10 mg tablet      allopurinol (ZYLOPRIM) 100 mg tablet        Plan:  Reviewed with the patient and his daughter. At this point in time I think daily treatment is necessary. I will treat him with prednisone for a week and then in 1 week's time they will start allopurinol 100 mg daily. We can titrate this dose as needed depending on response to therapy.

## 2018-01-04 NOTE — NURSING NOTE
Patient Information     Patient Name MRN Sex Angelo Rowland [id number] Male 3/6/1928      Nursing Note by Carole Keen LPN at 2017  3:20 PM     Author:  Carole Keen LPN Service:  (none) Author Type:  NURS- Licensed Practical Nurse     Filed:  2017  3:56 PM Encounter Date:  2017 Status:  Signed     :  Carole Keen LPN (NURS- Licensed Practical Nurse)            The patient is here today to be seen for gout in his right foot.  Carole Keen LPN......2017  3:51 PM

## 2018-01-04 NOTE — TELEPHONE ENCOUNTER
Patient Information     Patient Name MRN Sex Angelo Rowland [id number] Male 3/6/1928      Telephone Encounter by Cydney Marsh RN at 2017 10:34 AM     Author:  Cydney Marsh RN  Service:  (none) Author Type:  NURS- Registered Nurse     Filed:  2017 12:03 PM  Encounter Date:  2017 Status:  Addendum     :  Cydney Marsh RN (NURS- Registered Nurse)        Related Notes: Original Note by Cydney Marsh RN (NURS- Registered Nurse) filed at 2017 10:41 AM            risperiDONE (RISPERDAL) 0.25 mg tablet  TAKE 1 TAB BY MOUTH EVERY 8 HOURS AS NEEDED FOR AGITATION  Disp: 20 tablet Refills:     Class: eRx Start: 2017    Documented:8 months ago  Last refill:3/14/2017  To be filled at: [id number] - [place], [place] - [address]: [phone]    Last visit with CHULA BELCHER was on: 2017 in [facility]  PCP:  Chula Belcher MD     Two different instructions noted on current medication list.  Called ANA Dolores to verify instructions and she is unsure as he resides at [facility].  Left message for [facility] to return call  Justine from [facility] returned call and states patient takes Risperdal 0.25mg one tablet by mouth every 8 hours as needed for agitation and scheduled Risperdal 0.5mg tablet take one tablet by mouth two times daily.  Spoke with [facility] and this is what they have on file also but Risperdal 0.5 mg does not need refill at this time.    Unable to complete prescription refill per RN Medication Refill Policy.................... CYDNEY MARSH RN ....................  2017   10:34 AM

## 2018-01-04 NOTE — TELEPHONE ENCOUNTER
Patient Information     Patient Name MRN Sex Angelo Rowland [id number] Male 3/6/1928      Telephone Encounter by Chip Garcia MD at 2017  1:45 PM     Author:  Chip Garcia MD Service:  (none) Author Type:  Physician     Filed:  2017  1:45 PM Encounter Date:  2017 Status:  Signed     :  Chip Garcia MD (Physician)            3:40 PM Monday

## 2018-01-04 NOTE — TELEPHONE ENCOUNTER
Patient Information     Patient Name MRN Sex Angelo Rowland [id number] Male 3/6/1928      Telephone Encounter by Carole Keen LPN at 2017  1:29 PM     Author:  Carole Keen LPN Service:  (none) Author Type:  NURS- Licensed Practical Nurse     Filed:  2017  1:30 PM Encounter Date:  2017 Status:  Signed     :  Carole Keen LPN (NURS- Licensed Practical Nurse)            The patient would like a work in appointment on  around 3:50pm. He would like to be seen for gout and they are requesting this time frame because his wife has a appointment than.  Carole Keen LPN......2017  1:30 PM

## 2018-01-04 NOTE — TELEPHONE ENCOUNTER
Patient Information     Patient Name MRN Sex Angelo Rowland [id number] Male 3/6/1928      Telephone Encounter by Carole Keen LPN at 2017  2:07 PM     Author:  Carole Keen LPN Service:  (none) Author Type:  NURS- Licensed Practical Nurse     Filed:  2017  2:08 PM Encounter Date:  2017 Status:  Signed     :  Carole Keen LPN (NURS- Licensed Practical Nurse)            Contacted the patients EC and gave her the appointment time below. The patient was placed in Chip Garcia MD schedule.  Carole Keen LPN......2017  2:07 PM

## 2018-01-11 ENCOUNTER — COMMUNICATION - GICH (OUTPATIENT)
Dept: INTERNAL MEDICINE | Facility: OTHER | Age: 83
End: 2018-01-11

## 2018-01-11 DIAGNOSIS — G30.1 ALZHEIMER'S DISEASE WITH LATE ONSET (CODE) (H): ICD-10-CM

## 2018-01-11 DIAGNOSIS — F02.818 DEMENTIA IN OTHER DISEASES CLASSIFIED ELSEWHERE WITH BEHAVIORAL DISTURBANCE: ICD-10-CM

## 2018-01-15 ENCOUNTER — AMBULATORY - GICH (OUTPATIENT)
Dept: SCHEDULING | Facility: OTHER | Age: 83
End: 2018-01-15

## 2018-01-24 ENCOUNTER — HISTORY (OUTPATIENT)
Dept: INTERNAL MEDICINE | Facility: OTHER | Age: 83
End: 2018-01-24

## 2018-01-24 ENCOUNTER — OFFICE VISIT - GICH (OUTPATIENT)
Dept: INTERNAL MEDICINE | Facility: OTHER | Age: 83
End: 2018-01-24

## 2018-01-24 DIAGNOSIS — M1A.09X1 IDIOPATHIC CHRONIC GOUT OF MULTIPLE SITES WITH TOPHUS: ICD-10-CM

## 2018-01-25 VITALS
WEIGHT: 198 LBS | BODY MASS INDEX: 28.82 KG/M2 | DIASTOLIC BLOOD PRESSURE: 66 MMHG | HEART RATE: 72 BPM | SYSTOLIC BLOOD PRESSURE: 118 MMHG

## 2018-01-25 VITALS
BODY MASS INDEX: 29.11 KG/M2 | TEMPERATURE: 97.6 F | DIASTOLIC BLOOD PRESSURE: 70 MMHG | SYSTOLIC BLOOD PRESSURE: 128 MMHG | HEART RATE: 64 BPM | HEART RATE: 84 BPM | SYSTOLIC BLOOD PRESSURE: 120 MMHG | WEIGHT: 200 LBS | WEIGHT: 199 LBS | DIASTOLIC BLOOD PRESSURE: 66 MMHG | BODY MASS INDEX: 29.39 KG/M2

## 2018-01-25 VITALS
BODY MASS INDEX: 29.33 KG/M2 | DIASTOLIC BLOOD PRESSURE: 62 MMHG | HEIGHT: 69 IN | WEIGHT: 198 LBS | SYSTOLIC BLOOD PRESSURE: 110 MMHG | HEART RATE: 76 BPM

## 2018-02-09 VITALS
BODY MASS INDEX: 28.8 KG/M2 | WEIGHT: 195 LBS | DIASTOLIC BLOOD PRESSURE: 66 MMHG | HEART RATE: 70 BPM | SYSTOLIC BLOOD PRESSURE: 122 MMHG

## 2018-02-12 NOTE — TELEPHONE ENCOUNTER
Patient Information     Patient Name MRN Angelo Natarajan [id number] Male 3/6/1928      Telephone Encounter by Mehnaz Keen RN at 2018 10:55 AM     Author:  Mehnaz Keen RN Service:  (none) Author Type:  NURS- Registered Nurse     Filed:  2018 11:53 AM Encounter Date:  2018 Status:  Signed     :  Mehnaz Keen RN (NURS- Registered Nurse)            Returned call to nurse Justine at [facility] who states that pharmacy discontinued patient's scheduled Risperdone order for 0.5mg BID at the time the PRN order was received on 17.  Per chart, patient still has 2 separate orders for Risperdone 0.5mg - one scheduled and one as needed for agitation. It was confirmed this was Dr. Garcia's intention.     Called pharmacist, Hina, and explained that Chip Garcia MD never d/c'd the scheduled order. Pharmacy was asking to combine the two orders together in an effort to make billing and payment from insurance work better. Discussed with Chip Garcia MD who stated these need to be 2 separate orders. Per Hina, since both Rx changed to the same dose the insurance company now has a problem when billed close together. Agreement was reached to change PRN order to Risperdone 0.25mg, take 2 tablets every 6 hrs as needed; per pharmacy this will minimize potential payment issues. Verbal orders given to Hina per Chip Garcia MD orders. Explained new orders for Risperdone to Justine at [facility], and they are pleased with this solution. Routed to Chip Garcia MD for final approval.   Mehnaz Keen RN ....................  2018   11:45 AM

## 2018-02-12 NOTE — TELEPHONE ENCOUNTER
Patient Information     Patient Name MRN Angelo Natarajan [id number] Male 3/6/1928      Telephone Encounter by Carole Keen LPN at 2017 12:30 PM     Author:  Carole Keen LPN Service:  (none) Author Type:  NURS- Licensed Practical Nurse     Filed:  2017 12:31 PM Encounter Date:  2017 Status:  Signed     :  Carole Keen LPN (NURS- Licensed Practical Nurse)            Contacted colin at [facility] and let her know his RX was approved and sent to [facility] at 11:40am today.  Carole Keen LPN......2017  12:31 PM

## 2018-02-12 NOTE — TELEPHONE ENCOUNTER
Patient Information     Patient Name MRN Angelo Natarajan [id number] Male 3/6/1928      Telephone Encounter by Chip Garcia MD at 2017  3:16 PM     Author:  Chip Garcia MD Service:  (none) Author Type:  Physician     Filed:  2017  3:16 PM Encounter Date:  2017 Status:  Signed     :  Chip Garcia MD (Physician)            This message is not clear. Please clarify the dose of Risperdal that needs to be changed and refilled.

## 2018-02-12 NOTE — TELEPHONE ENCOUNTER
Patient Information     Patient Name MRN Sex Angelo Rowland [id number] Male 3/6/1928      Telephone Encounter by Cydney Marsh RN at 2017  3:10 PM     Author:  Cydney Marsh RN Service:  (none) Author Type:  NURS- Registered Nurse     Filed:  2017  3:14 PM Encounter Date:  2017 Status:  Signed     :  Cydney Marsh RN (NURS- Registered Nurse)            Received fax from [facility] pharmacy in regards to order they received from [facility] regarding Rx for Risperidone increase to 0.5 mg PRN which was 0.5mg BID and was okayed and signed by .    [facility] looking for new Rx to be sent on this.    CYDNEY MARSH RN ....................  2017   3:13 PM

## 2018-02-12 NOTE — TELEPHONE ENCOUNTER
Patient Information     Patient Name MRN Angelo Natarajan [id number] Male 3/6/1928      Telephone Encounter by Cydney Roberson RN at 2017  3:19 PM     Author:  Cydney Roberson RN Service:  (none) Author Type:  NURS- Registered Nurse     Filed:  2017  3:20 PM Encounter Date:  2017 Status:  Signed     :  Cydney Roberson RN (NURS- Registered Nurse)            Will present hard copy order.  CYDNEY ROBERSON RN ....................  2017   3:20 PM

## 2018-02-13 NOTE — NURSING NOTE
Patient Information     Patient Name MRN Sex Angelo Rowland [id number] Male 3/6/1928      Nursing Note by José Laird at 2018  3:40 PM     Author:  José Laird Service:  (none) Author Type:  (none)     Filed:  2018  3:55 PM Encounter Date:  2018 Status:  Signed     :  José Laird            Patient presents to the clinic today for follow up on gout.   José Laird LPN .............2018 3:45 PM

## 2018-02-13 NOTE — PROGRESS NOTES
Patient Information     Patient Name MRN Sex Angelo Rowland [id number] Male 3/6/1928      Progress Notes by Chip Garcia MD at 2018  3:40 PM     Author:  Chip Garcia MD Service:  (none) Author Type:  Physician     Filed:  2018  4:08 PM Encounter Date:  2018 Status:  Signed     :  Chip Garcia MD (Physician)            SUBJECTIVE:    Angelo Santizo is a 89 y.o. male who presents for recheck on gout.    HPI Comments: This patient is in today for recheck on his gout. He has significant difficulties with gout that are treated with colchicine when necessary. He also takes 100 mg of allopurinol daily. Last week he was having significant problems and the home reported that he was not improving with colchicine so I suggested that he needed to be seen. Now that he's been on the colchicine longer, he is actually doing much better and the synovitis and inflammation is essentially gone. He feels great and has no complaints or concerns.      Allergies     Allergen  Reactions     Nsaids (Non-Steroidal Anti-Inflammatory Drug) GI Bleeding   ,   Current Outpatient Prescriptions     Medication  Sig     acetaminophen (TYLENOL) 325 mg tablet Take 1-2 tablets by mouth every 6 hours if needed (PAIN). Max acetaminophen dose: 4000mg in 24 hrs.     allopurinol (ZYLOPRIM) 100 mg tablet Take 2 tablets by mouth once daily.     colchicine 0.6 mg tablet Take twice a day as needed for gout     melatonin 3 mg tablet Take 1 tablet by mouth at bedtime.     risperiDONE (RISPERDAL) 0.25 mg tablet Take 2 tablets by mouth every 6 hours if needed.     risperiDONE (RISPERDAL) 0.5 mg tablet Take 1 tablet by mouth 2 times daily.     sertraline (ZOLOFT) 50 mg tablet Take 1.5 tablets by mouth once daily.     traZODone (DESYREL) 100 mg tablet Take 1 tablet by mouth at bedtime.     No current facility-administered medications for this visit.      Medications have been reviewed by me and are current to the best of my  knowledge and ability. ,   Past Medical History:     Diagnosis  Date     Alzheimer's dementia      Depression      GI bleed due to NSAIDs      Gout      Insomnia     and   Patient Active Problem List      Diagnosis Date Noted     Chronic idiopathic gout of multiple sites 10/04/2016     Acute upper GI bleed 09/02/2016     Alzheimer's dementia      Depression      Insomnia        REVIEW OF SYSTEMS:  Review of Systems   Unable to perform ROS: Mental acuity       OBJECTIVE:  /66 (Cuff Site: Right Arm, Position: Sitting, Cuff Size: Adult Regular)  Pulse 70  Wt 88.5 kg (195 lb)  BMI 28.8 kg/m2    EXAM:   Physical Exam   Constitutional: He is well-developed, well-nourished, and in no distress. No distress.   Skin: He is not diaphoretic.   Psychiatric:   Significant dementia   Nursing note and vitals reviewed.      ASSESSMENT/PLAN:    ICD-10-CM    1. 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 Uric acid level was done a year or so ago and was not repeated today after discussion with the daughter. It was 6 at that time. Continue colchicine twice daily as needed. Further adjustment will be dependent on his clinical progress.

## 2018-02-28 ENCOUNTER — DOCUMENTATION ONLY (OUTPATIENT)
Dept: FAMILY MEDICINE | Facility: OTHER | Age: 83
End: 2018-02-28

## 2018-02-28 PROBLEM — F32.A DEPRESSION: Status: ACTIVE | Noted: 2018-02-28

## 2018-02-28 PROBLEM — G47.00 INSOMNIA: Status: ACTIVE | Noted: 2018-02-28

## 2018-02-28 PROBLEM — F02.80 ALZHEIMER'S DEMENTIA (H): Status: ACTIVE | Noted: 2018-02-28

## 2018-02-28 PROBLEM — G30.9 ALZHEIMER'S DEMENTIA (H): Status: ACTIVE | Noted: 2018-02-28

## 2018-02-28 RX ORDER — ACETAMINOPHEN 325 MG/1
325-650 TABLET ORAL EVERY 6 HOURS PRN
COMMUNITY
Start: 2017-10-23

## 2018-02-28 RX ORDER — COLCHICINE 0.6 MG/1
0.6 TABLET ORAL 2 TIMES DAILY PRN
COMMUNITY
Start: 2016-09-28 | End: 2019-01-01

## 2018-02-28 RX ORDER — LANOLIN ALCOHOL/MO/W.PET/CERES
3 CREAM (GRAM) TOPICAL AT BEDTIME
COMMUNITY
Start: 2017-11-15 | End: 2018-01-01

## 2018-02-28 RX ORDER — NYSTATIN 10B UNIT
POWDER (EA) MISCELLANEOUS 3 TIMES DAILY PRN
COMMUNITY
Start: 2017-09-25 | End: 2018-01-01

## 2018-02-28 RX ORDER — RISPERIDONE 0.5 MG/1
0.5 TABLET ORAL 2 TIMES DAILY
COMMUNITY
Start: 2018-01-12 | End: 2018-01-01

## 2018-02-28 RX ORDER — ALLOPURINOL 100 MG/1
100 TABLET ORAL DAILY
COMMUNITY
Start: 2017-09-11 | End: 2019-01-01

## 2018-02-28 RX ORDER — RISPERIDONE 0.25 MG/1
0.5 TABLET ORAL EVERY 6 HOURS PRN
COMMUNITY
Start: 2018-01-12 | End: 2018-01-01

## 2018-02-28 RX ORDER — TRAZODONE HYDROCHLORIDE 100 MG/1
100 TABLET ORAL AT BEDTIME
COMMUNITY
Start: 2017-09-11 | End: 2018-01-01

## 2018-05-18 ENCOUNTER — DOCUMENTATION ONLY (OUTPATIENT)
Dept: OTHER | Facility: CLINIC | Age: 83
End: 2018-05-18

## 2018-05-18 DIAGNOSIS — Z71.89 ACP (ADVANCE CARE PLANNING): Chronic | ICD-10-CM

## 2018-06-05 ENCOUNTER — MEDICAL CORRESPONDENCE (OUTPATIENT)
Dept: HEALTH INFORMATION MANAGEMENT | Facility: OTHER | Age: 83
End: 2018-06-05

## 2018-07-23 NOTE — PROGRESS NOTES
Patient Information     Patient Name  Angelo Santizo MRN  [id number] Sex  Male   3/6/1928      Letter by Chip Garcia MD at      Author:  Chip Garcia MD Service:  (none) Author Type:  (none)    Filed:   Encounter Date:  2017 Status:  (Other)           Angelo Santizo  [address]  [place] [address]          2017    Dear Mr. Santizo:    Following are the tests completed during your last clinic visit:    Results for orders placed or performed in visit on 17      BASIC METABOLIC PANEL      Result  Value Ref Range    SODIUM 139 133 - 143 mmol/L    POTASSIUM 4.2 3.5 - 5.1 mmol/L    CHLORIDE 103 98 - 107 mmol/L    CO2,TOTAL 22 21 - 31 mmol/L    ANION GAP 14 5 - 18                    GLUCOSE 85 70 - 105 mg/dL    CALCIUM 8.8 8.6 - 10.3 mg/dL    BUN 17 7 - 25 mg/dL    CREATININE 1.13 0.70 - 1.30 mg/dL    BUN/CREAT RATIO           15                    GFR if African American >60 >60 ml/min/1.73m2    GFR if not African American >60 >60 ml/min/1.73m2   HEMOGLOBIN      Result  Value Ref Range    HEMOGLOBIN                12.1 (L) 13.5 - 17.5 g/dL    MCV                       90 80 - 100 fL         You are still a little bit anemic, but I am satisfied with the result. Your electrolytes look normal. Congratulations on this report.    Sincerely,      Chip Garcia MD  Internal Medicine  [facility]

## 2018-08-13 NOTE — TELEPHONE ENCOUNTER
Routing refill request to provider for review/approval because:  Labs not current:  Lipid and CBC  Patient needs to be seen because:  Per protocol patient needs to be seen every six months.    LVO 1/24/18    Cydney Roberson RN on 8/13/2018 at 3:21 PM

## 2018-08-27 NOTE — TELEPHONE ENCOUNTER
Last OV 1/24/18 with PCP. Prescription approved per [facility] Refill Protocol. Sertraline authorized for 90 days and 4 refill and trazodone approved for 90 days and 2 refills at this time. Roseanna Joseph RN on 8/27/2018 at 11:37 AM

## 2018-09-24 NOTE — PROGRESS NOTES
Chief Complaint:  This patient is here for a comprehensive review of their multiple medical problems, renewal of medications and update on necessary health maintenance issues.      HPI: This patient is brought in today basically for yearly review.  He has progressive and fairly severe dementia.  His daughter is in with him today.  He has been having some more behavioral issues at his assisted living.  We do have him on Risperdal for control of his behaviors.  Other medications have been tried that have had side effects or have not worked.  Daughter is more comfortable with him staying on Risperdal and would prefer that we just increase the dose on this if needed for behavioral issues rather than trying some additional medications, etc.  The urine was brought in today for testing just to make sure he does not have an infection.    Other than that the patient has been doing fairly well.  He does have a history of an upper GI bleed related to anti-inflammatory medication so we try to avoid those completely.  He has a history of some significant gout but is currently controlled with allopurinol on a daily basis.  He takes colchicine if needed for gouty flares.    He has a history of some fairly resistant tinea.  He is currently using nystatin for this.  I told the daughter we should change this to a different antifungal for better effectiveness.    Medications are reconciled.  Past medical history, past surgical history, family history and social histories are all reviewed and updated today.    Past Medical History:   Diagnosis Date     Alzheimer's disease     No Comments Provided     Gout     No Comments Provided     Insomnia     No Comments Provided     Major depressive disorder, single episode     No Comments Provided     UGI bleed     Secondary to NSAIDS       History reviewed. No pertinent surgical history.    Current Outpatient Prescriptions   Medication Sig Dispense Refill     acetaminophen (TYLENOL) 325 MG  tablet Take 325-650 mg by mouth every 6 hours as needed for pain       allopurinol (ZYLOPRIM) 100 MG tablet Take 100 mg by mouth daily       colchicine 0.6 MG tablet Take 0.6 mg by mouth 2 times daily as needed       melatonin 3 MG tablet Take 3 mg by mouth At Bedtime       Miconazole Nitrate POWD powder Apply topically 2 times daily as needed (rash) 100 g 3     risperiDONE (RISPERDAL) 0.25 MG tablet Take 0.5 mg by mouth every 6 hours as needed       risperiDONE (RISPERDAL) 0.5 MG tablet 2 in am, 1 in pm 180 tablet 1     sertraline (ZOLOFT) 50 MG tablet Take 1.5 tablets (75 mg) by mouth daily 45 tablet 1     traZODone (DESYREL) 100 MG tablet TAKE 1 TABLET BY MOUTH NIGHTLY AT BEDTIME 90 tablet 2     [DISCONTINUED] risperiDONE (RISPERDAL) 0.25 MG tablet Give 2 tablets twice a day by mouth.  May give an additional 1 pill as needed mid day or late evening if he is showing agitation and aggression. 360 tablet 1     [DISCONTINUED] risperiDONE (RISPERDAL) 0.5 MG tablet TAKE 1 TABLET BY MOUTH TWICE DAILY 180 tablet 1     [DISCONTINUED] risperiDONE (RISPERDAL) 0.5 MG tablet TAKE 1 TABLET BY MOUTH TWICE A DAY 60 tablet 0     [DISCONTINUED] sertraline (ZOLOFT) 50 MG tablet TAKE 1 AND 1/2 TABS (75MG) BY MOUTH ONCE DAILY 90 tablet 4       Allergies   Allergen Reactions     Nsaids      Other reaction(s): GI Bleeding       Family History   Problem Relation Age of Onset     Dementia Brother      Lewy Body      Other - See Comments Mother      Pneumonia     Other - See Comments Father      Old age       Social History     Social History     Marital status:      Spouse name: N/A     Number of children: N/A     Years of education: N/A     Occupational History     Not on file.     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  Moved to memory care at [facility] with wife.  From [place] 2016.       Review of Systems   Unable to perform ROS: Dementia       Physical Exam   Constitutional: He appears well-developed and well-nourished. No distress.   HENT:   Head: Normocephalic.   Right Ear: External ear normal.   Left Ear: External ear normal.   Mouth/Throat: Oropharynx is clear and moist.   Eyes: Pupils are equal, round, and reactive to light.   Neck: Normal range of motion. Neck supple. No JVD present. No tracheal deviation present. No thyromegaly present.   Cardiovascular: Normal rate, regular rhythm and normal heart sounds.    Pulmonary/Chest: Effort normal and breath sounds normal. No respiratory distress. He has no wheezes. He has no rales.   Abdominal: Soft. Bowel sounds are normal. He exhibits no distension. There is no tenderness.   Musculoskeletal: He exhibits no edema.   Lymphadenopathy:     He has no cervical adenopathy.   Neurological: He is alert.   Pleasant with significant dementia   Skin: Skin is warm and dry. He is not diaphoretic.   Nursing note and vitals reviewed.      Assessment:      ICD-10-CM    1. Dysuria R30.0 UA reflex to Microscopic and Culture   2. Late onset Alzheimer's disease without behavioral disturbance G30.1     F02.80    3. Depression, unspecified depression type F32.9    4. Primary insomnia F51.01    5. Idiopathic chronic gout of multiple sites without tophus M1A.09X0    6. Tinea cruris B35.6 Miconazole Nitrate POWD powder   7. 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  Urine today was negative.  Discussion  with daughter and elected to increase Risperdal to 1 mg in the morning, continue other dosing the same.  He had been on half milligram twice daily.  We can titrate the dose up from there if and as needed.  She is aware of the potential for side effects.  For the tinea in his groin, we will stop the nystatin and use Mycostatin powder as needed.  We talked today about doing some lab work, daughter would rather not do any which I think is perfectly reasonable and fine.  Follow-up with me will be as needed going forward.

## 2018-09-24 NOTE — TELEPHONE ENCOUNTER
Contacted colin at [facility] she states the patient has had increased urination and has been more combative lately. They are wanting to bring in a urine sample but have not had any luck getting one before his appointment today. They were just calling to give Chip Garcia MD a update.  Carole Keen LPN on 9/24/2018 at 11:27 AM

## 2018-09-24 NOTE — MR AVS SNAPSHOT
After Visit Summary   9/24/2018    Angelo Sanitzo    MRN: [id number]           Patient Information     Date Of Birth          3/6/1928        Visit Information        Provider Department      9/24/2018 3:20 PM Chip Garcia MD [facility]        Today's Diagnoses     Dysuria    -  1    Late onset Alzheimer's disease without behavioral disturbance        Depression, unspecified depression type        Primary insomnia        Idiopathic chronic gout of multiple sites without tophus        Tinea cruris        Senile dementia, with behavioral disturbance           Follow-ups after your visit        Who to contact     If you have questions or need follow up information about today's clinic visit or your schedule please contact [facility] AND [facility] directly at [phone].  Normal or non-critical lab and imaging results will be communicated to you by MyChart, letter or phone within 4 business days after the clinic has received the results. If you do not hear from us within 7 days, please contact the clinic through MyChart or phone. If you have a critical or abnormal lab result, we will notify you by phone as soon as possible.  Submit refill requests through [employer] or call your pharmacy and they will forward the refill request to us. Please allow 3 business days for your refill to be completed.          Additional Information About Your Visit        Care EveryWhere ID     This is your Care EveryWhere ID. This could be used by other organizations to access your [place] medical records  [id number]        Your Vitals Were     Pulse BMI (Body Mass Index)                72 29.98 kg/m2           Blood Pressure from Last 3 Encounters:   09/24/18 116/64   01/24/18 122/66   11/15/17 128/70    Weight from Last 3 Encounters:   09/24/18 203 lb (92.1 kg)   01/24/18 195 lb (88.5 kg)   11/15/17 199 lb (90.3 kg)              We Performed the Following     UA reflex to Microscopic and  Culture          Today's Medication Changes          These changes are accurate as of 9/24/18  4:37 PM.  If you have any questions, ask your nurse or doctor.               Start taking these medicines.        Dose/Directions    Miconazole Nitrate Powd powder   Used for:  Tinea cruris   Started by:  Chip Garcia MD        Apply topically 2 times daily as needed (rash)   Quantity:  100 g   Refills:  3         These medicines have changed or have updated prescriptions.        Dose/Directions    * risperiDONE 0.25 MG tablet   Commonly known as:  risperDAL   This may have changed:    - Another medication with the same name was changed. Make sure you understand how and when to take each.  - Another medication with the same name was removed. Continue taking this medication, and follow the directions you see here.   Changed by:  Chip Garcia MD        Dose:  0.5 mg   Take 0.5 mg by mouth every 6 hours as needed   Refills:  0       * risperiDONE 0.5 MG tablet   Commonly known as:  risperDAL   This may have changed:    - See the new instructions.  - Another medication with the same name was removed. Continue taking this medication, and follow the directions you see here.   Used for:  Senile dementia, with behavioral disturbance   Changed by:  Chip Garcia MD        2 in am, 1 in pm   Quantity:  180 tablet   Refills:  1       sertraline 50 MG tablet   Commonly known as:  ZOLOFT   This may have changed:  Another medication with the same name was removed. Continue taking this medication, and follow the directions you see here.   Used for:  Senile dementia, with behavioral disturbance   Changed by:  Chip Garcia MD        Dose:  75 mg   Take 1.5 tablets (75 mg) by mouth daily   Quantity:  45 tablet   Refills:  1       * Notice:  This list has 2 medication(s) that are the same as other medications prescribed for you. Read the directions carefully, and ask your doctor or other care provider to review them with you.       Stop taking these medicines if you haven't already. Please contact your care team if you have questions.     nystatin Powd   Stopped by:  Chip Garcia MD                Where to get your medicines      These medications were sent to [id number] - [place], [place] - [address]  [address], [place] [address]     Phone:  [phone]     Miconazole Nitrate Powd powder                Primary Care Provider Office Phone # Fax #    CHRISTINE Lin [phone]       [facility] [address]        Equal Access to Services     [facility]: Hadii aad ku hadasho Soomaali, waaxda luqadaha, qaybta kaalmada adeegyada, waxmorgan stonen fatemeh hood . So [facility] [phone].    ATENCIÓN: Si habla español, tiene a oliveira disposición servicios gratuitos de asistencia lingüística. [facility] [phone].    We comply with applicable federal civil rights laws and [place] laws. We do not discriminate on the basis of race, color, national origin, age, disability, sex, sexual orientation, or gender identity.            Thank you!     Thank you for choosing [facility] AND [facility]  for your care. Our goal is always to provide you with excellent care. Hearing back from our patients is one way we can continue to improve our services. Please take a few minutes to complete the written survey that you may receive in the mail after your visit with us. Thank you!             Your Updated Medication List - Protect others around you: Learn how to safely use, store and throw away your medicines at www.disposemymeds.org.          This list is accurate as of 9/24/18  4:37 PM.  Always use your most recent med list.                   Brand Name Dispense Instructions for use Diagnosis    acetaminophen 325 MG tablet    TYLENOL     Take 325-650 mg by mouth every 6 hours as needed for pain        allopurinol 100 MG tablet    ZYLOPRIM     Take 100 mg by  mouth daily        colchicine 0.6 MG tablet    COLCYRS     Take 0.6 mg by mouth 2 times daily as needed        melatonin 3 MG tablet      Take 3 mg by mouth At Bedtime        Miconazole Nitrate Powd powder     100 g    Apply topically 2 times daily as needed (rash)    Tinea cruris       * risperiDONE 0.25 MG tablet    risperDAL     Take 0.5 mg by mouth every 6 hours as needed        * risperiDONE 0.5 MG tablet    risperDAL    180 tablet    2 in am, 1 in pm    Senile dementia, with behavioral disturbance       sertraline 50 MG tablet    ZOLOFT    45 tablet    Take 1.5 tablets (75 mg) by mouth daily    Senile dementia, with behavioral disturbance       traZODone 100 MG tablet    DESYREL    90 tablet    TAKE 1 TABLET BY MOUTH NIGHTLY AT BEDTIME    Insomnia, unspecified type       * Notice:  This list has 2 medication(s) that are the same as other medications prescribed for you. Read the directions carefully, and ask your doctor or other care provider to review them with you.

## 2018-10-01 NOTE — TELEPHONE ENCOUNTER
The patients daughter called and states that they need a [facility] sent [facility] to [facility] stating it is ok for the patient to use kanka for canker sores in his mouth.   Carole Keen LPN on 10/1/2018 at 3:44 PM

## 2018-10-04 NOTE — TELEPHONE ENCOUNTER
Routing refill request to provider for review/approval because:  Drug not on the [facility] refill protocol   Please review health advisory warning.    LOV: 9/24/18    Cydney Roberson RN on 10/4/2018 at 3:47 PM

## 2018-11-14 NOTE — TELEPHONE ENCOUNTER
Contacted roberto at [facility] and she reports Chip Garcia MD changed the patients risperdone RX to one 0.5mg tablet BID one in the am and one in the PM. This was changed due to a fall on 10- and was sent on a fax for the medication change. The patient is out of this medication and needs a new RX [facility] to [facility] with the new directions. The order is teed up below. PCP is out of the office until next week.  Carole Keen LPN on 11/14/2018 at 9:15 AM

## 2018-11-14 NOTE — TELEPHONE ENCOUNTER
Contacted roberto at [facility] and let her know that the RX was done and sent in to the pharmacy.  Carole Keen LPN on 11/14/2018 at 10:29 AM

## 2018-11-19 NOTE — NURSING NOTE
"The patient is here today to have a follow up on his medication.  Carole Keen LPN on 11/19/2018 at 2:51 PM  Chief Complaint   Patient presents with     Recheck Medication       Initial /74 (BP Location: Right arm, Patient Position: Sitting, Cuff Size: Adult Large)  Pulse 68  Wt 201 lb (91.2 kg)  BMI 29.68 kg/m2 Estimated body mass index is 29.68 kg/(m^2) as calculated from the following:    Height as of 9/11/17: 5' 9\" (1.753 m).    Weight as of this encounter: 201 lb (91.2 kg).  Medication Reconciliation: incomplete    Carole Keen LPN    "

## 2018-11-19 NOTE — TELEPHONE ENCOUNTER
"Routing refill request to provider for review/approval because:  9/24/18  Per phone note on 11/14/18per Carole Keen LPN  \"Contacted sismaude at [facility] and she reports Chip Garcia MD changed the patients risperdone RX to one 0.5mg tablet BID one in the am and one in the PM. This was changed due to a fall on 10- and was sent on a fax for the medication change. The patient is out of this medication and needs a new RX [facility] with the new directions. \"    Cydney Roberson RN on 11/19/2018 at 9:43 AM      "

## 2018-11-19 NOTE — MR AVS SNAPSHOT
After Visit Summary   11/19/2018    Angelo Santizo    MRN: [id number]           Patient Information     Date Of Birth          3/6/1928        Visit Information        Provider Department      11/19/2018 3:00 PM Chip Garcia MD [facility]        Today's Diagnoses     Late onset Alzheimer's disease without behavioral disturbance    -  1    Senile dementia, with behavioral disturbance           Follow-ups after your visit        Who to contact     If you have questions or need follow up information about today's clinic visit or your schedule please contact [facility] directly at [phone].  Normal or non-critical lab and imaging results will be communicated to you by MyChart, letter or phone within 4 business days after the clinic has received the results. If you do not hear from us within 7 days, please contact the clinic through MyChart or phone. If you have a critical or abnormal lab result, we will notify you by phone as soon as possible.  Submit refill requests through [employer] or call your pharmacy and they will forward the refill request to us. Please allow 3 business days for your refill to be completed.          Additional Information About Your Visit        Care EveryWhere ID     This is your Care EveryWhere ID. This could be used by other organizations to access your [place] medical records  [id number]        Your Vitals Were     Pulse BMI (Body Mass Index)                68 29.68 kg/m2           Blood Pressure from Last 3 Encounters:   11/19/18 132/74   09/24/18 116/64   01/24/18 122/66    Weight from Last 3 Encounters:   11/19/18 201 lb (91.2 kg)   09/24/18 203 lb (92.1 kg)   01/24/18 195 lb (88.5 kg)              Today, you had the following     No orders found for display         Today's Medication Changes          These changes are accurate as of 11/19/18  3:15 PM.  If you have any questions, ask your nurse or doctor.               These  medicines have changed or have updated prescriptions.        Dose/Directions    * risperiDONE 0.25 MG tablet   Commonly known as:  risperDAL   This may have changed:  Another medication with the same name was changed. Make sure you understand how and when to take each.   Changed by:  Chip Garcia MD        Dose:  0.5 mg   Take 0.5 mg by mouth every 6 hours as needed   Refills:  0       * risperiDONE 0.5 MG tablet   Commonly known as:  risperDAL   This may have changed:  See the new instructions.   Used for:  Senile dementia, with behavioral disturbance   Changed by:  Chip Garcia MD        2 in am, 1 in pm   Quantity:  180 tablet   Refills:  1       * Notice:  This list has 2 medication(s) that are the same as other medications prescribed for you. Read the directions carefully, and ask your doctor or other care provider to review them with you.             Primary Care Provider Office Phone # Fax #    Chip Garcia [phone] [address] [facility] [address]        Equal Access to Services     [facility]: Hadii arnulfo ku hadasho Soomaali, waaxda luqadaha, qaybta kaalmada adeegyada, waxay alfredoin percy hood . So [facility] [phone].    ATENCIÓN: Si habla español, tiene a oliveira disposición servicios gratuitos de asistencia lingüística. Llame al [phone].    We comply with applicable federal civil rights laws and [place] laws. We do not discriminate on the basis of race, color, national origin, age, disability, sex, sexual orientation, or gender identity.            Thank you!     Thank you for choosing [facility] AND [facility]  for your care. Our goal is always to provide you with excellent care. Hearing back from our patients is one way we can continue to improve our services. Please take a few minutes to complete the written survey that you may receive in the mail after your visit with us. Thank you!             Your Updated Medication List -  Protect others around you: Learn how to safely use, store and throw away your medicines at www.disposemymeds.org.          This list is accurate as of 11/19/18  3:15 PM.  Always use your most recent med list.                   Brand Name Dispense Instructions for use Diagnosis    acetaminophen 325 MG tablet    TYLENOL     Take 325-650 mg by mouth every 6 hours as needed for pain        allopurinol 100 MG tablet    ZYLOPRIM     Take 100 mg by mouth daily        colchicine 0.6 MG tablet    COLCYRS     Take 0.6 mg by mouth 2 times daily as needed        furosemide 20 MG tablet    LASIX    30 tablet    Take 1 tablet (20 mg) by mouth daily    Peripheral edema       melatonin 3 MG tablet      Take 3 mg by mouth At Bedtime        Miconazole Nitrate Powd powder     100 g    Apply topically 2 times daily as needed (rash)    Tinea cruris       * risperiDONE 0.25 MG tablet    risperDAL     Take 0.5 mg by mouth every 6 hours as needed        * risperiDONE 0.5 MG tablet    risperDAL    180 tablet    2 in am, 1 in pm    Senile dementia, with behavioral disturbance       sertraline 50 MG tablet    ZOLOFT    45 tablet    Take 1.5 tablets (75 mg) by mouth daily    Senile dementia, with behavioral disturbance       traZODone 100 MG tablet    DESYREL    90 tablet    TAKE 1 TABLET BY MOUTH NIGHTLY AT BEDTIME    Insomnia, unspecified type       * Notice:  This list has 2 medication(s) that are the same as other medications prescribed for you. Read the directions carefully, and ask your doctor or other care provider to review them with you.

## 2018-11-19 NOTE — PROGRESS NOTES
Chief Complaint: Follow-up on issues.    HPI: He is brought in today by his daughter.  Apparently a refill went to a different provider and the patient was instructed that he needed to be seen.  We had recently seen the patient for pretty much a yearly review and decided not to do any blood work or further testing.  We did review his behavioral issues today.  He is definitely better on the higher dose of Risperdal in the morning so we will continue that.  Other medications are reconciled although it is not clear if he is currently taking the sertraline as the daughter was not familiar with that medication.  I looked back in the chart and that is something that he came here on in 2016.    Past Medical History:   Diagnosis Date     Alzheimer's disease     No Comments Provided     Gout     No Comments Provided     Insomnia     No Comments Provided     Major depressive disorder, single episode     No Comments Provided     UGI bleed     Secondary to NSAIDS       History reviewed. No pertinent surgical history.    Allergies   Allergen Reactions     Nsaids      Other reaction(s): GI Bleeding       Current Outpatient Prescriptions   Medication Sig Dispense Refill     acetaminophen (TYLENOL) 325 MG tablet Take 325-650 mg by mouth every 6 hours as needed for pain       allopurinol (ZYLOPRIM) 100 MG tablet Take 100 mg by mouth daily       colchicine 0.6 MG tablet Take 0.6 mg by mouth 2 times daily as needed       furosemide (LASIX) 20 MG tablet Take 1 tablet (20 mg) by mouth daily 30 tablet 11     melatonin 3 MG tablet Take 3 mg by mouth At Bedtime       Miconazole Nitrate POWD powder Apply topically 2 times daily as needed (rash) 100 g 3     risperiDONE (RISPERDAL) 0.25 MG tablet Take 0.5 mg by mouth every 6 hours as needed       risperiDONE (RISPERDAL) 0.5 MG tablet 2 in am, 1 in pm 180 tablet 1     sertraline (ZOLOFT) 50 MG tablet Take 1.5 tablets (75 mg) by mouth daily 45 tablet 1     traZODone (DESYREL) 100 MG tablet TAKE  1 TABLET BY MOUTH NIGHTLY AT BEDTIME 90 tablet 2     [DISCONTINUED] risperiDONE (RISPERDAL) 0.5 MG tablet TAKE 1 TABLET BY MOUTH TWICE A DAY (AM AND PM) 180 tablet 1       Review of Systems   Constitutional: Negative.    Eyes: Negative.    Endocrine: Negative.    Allergic/Immunologic: Negative.        Physical Exam   Constitutional: He appears well-developed and well-nourished. No distress.   Neurological:   Pleasantly confused   Skin: He is not diaphoretic.   Nursing note and vitals reviewed.      Assessment:        ICD-10-CM    1. Late onset Alzheimer's disease without behavioral disturbance G30.1     F02.80    2. Senile dementia, with behavioral disturbance F03.91 risperiDONE (RISPERDAL) 0.5 MG tablet       Plan: Continue current medications.  If all goes well he will follow-up annually.

## 2018-11-28 NOTE — TELEPHONE ENCOUNTER
Routing refill request to provider for review/approval because:  Drug not on the [facility] refill protocol   Labs not current:  CBC, A1C, lipid    LOV 11-19-18.  Due for refills. Mehnaz Keen RN on 11/28/2018 at 8:25 AM

## 2019-01-01 ENCOUNTER — TELEPHONE (OUTPATIENT)
Dept: INTERNAL MEDICINE | Facility: OTHER | Age: 84
End: 2019-01-01

## 2019-01-01 ENCOUNTER — HOSPITAL ENCOUNTER (EMERGENCY)
Facility: OTHER | Age: 84
Discharge: HOME | End: 2019-06-27
Attending: FAMILY MEDICINE | Admitting: FAMILY MEDICINE
Payer: MEDICARE

## 2019-01-01 ENCOUNTER — MEDICAL CORRESPONDENCE (OUTPATIENT)
Dept: HEALTH INFORMATION MANAGEMENT | Facility: OTHER | Age: 84
End: 2019-01-01

## 2019-01-01 ENCOUNTER — APPOINTMENT (OUTPATIENT)
Dept: GENERAL RADIOLOGY | Facility: OTHER | Age: 84
End: 2019-01-01
Attending: FAMILY MEDICINE
Payer: MEDICARE

## 2019-01-01 ENCOUNTER — TELEPHONE (OUTPATIENT)
Dept: EMERGENCY MEDICINE | Facility: OTHER | Age: 84
End: 2019-01-01

## 2019-01-01 ENCOUNTER — NURSING HOME VISIT (OUTPATIENT)
Dept: GERIATRICS | Facility: OTHER | Age: 84
End: 2019-01-01
Attending: NURSE PRACTITIONER
Payer: MEDICARE

## 2019-01-01 ENCOUNTER — TELEPHONE (OUTPATIENT)
Dept: FAMILY MEDICINE | Facility: OTHER | Age: 84
End: 2019-01-01

## 2019-01-01 ENCOUNTER — RESULTS ONLY (OUTPATIENT)
Dept: LAB | Age: 84
End: 2019-01-01

## 2019-01-01 VITALS
TEMPERATURE: 97.7 F | BODY MASS INDEX: 26.23 KG/M2 | RESPIRATION RATE: 16 BRPM | OXYGEN SATURATION: 94 % | WEIGHT: 177.6 LBS | HEART RATE: 64 BPM | SYSTOLIC BLOOD PRESSURE: 122 MMHG | DIASTOLIC BLOOD PRESSURE: 70 MMHG

## 2019-01-01 VITALS
TEMPERATURE: 99 F | DIASTOLIC BLOOD PRESSURE: 116 MMHG | BODY MASS INDEX: 26.96 KG/M2 | SYSTOLIC BLOOD PRESSURE: 131 MMHG | HEIGHT: 69 IN | OXYGEN SATURATION: 91 % | WEIGHT: 182 LBS | RESPIRATION RATE: 16 BRPM | HEART RATE: 89 BPM

## 2019-01-01 VITALS
HEART RATE: 58 BPM | DIASTOLIC BLOOD PRESSURE: 58 MMHG | SYSTOLIC BLOOD PRESSURE: 122 MMHG | OXYGEN SATURATION: 91 % | TEMPERATURE: 97.6 F | RESPIRATION RATE: 18 BRPM

## 2019-01-01 DIAGNOSIS — F02.80 ALZHEIMER'S DEMENTIA WITHOUT BEHAVIORAL DISTURBANCE, UNSPECIFIED TIMING OF DEMENTIA ONSET: ICD-10-CM

## 2019-01-01 DIAGNOSIS — F03.918 SENILE DEMENTIA, WITH BEHAVIORAL DISTURBANCE (H): ICD-10-CM

## 2019-01-01 DIAGNOSIS — R01.1 HEART MURMUR: ICD-10-CM

## 2019-01-01 DIAGNOSIS — Z74.09 DECLINING MOBILITY: ICD-10-CM

## 2019-01-01 DIAGNOSIS — R09.02 HYPOXEMIA: ICD-10-CM

## 2019-01-01 DIAGNOSIS — G30.1 LATE ONSET ALZHEIMER'S DISEASE WITH BEHAVIORAL DISTURBANCE (H): Primary | ICD-10-CM

## 2019-01-01 DIAGNOSIS — G47.00 INSOMNIA, UNSPECIFIED TYPE: ICD-10-CM

## 2019-01-01 DIAGNOSIS — F03.918 SENILE DEMENTIA, WITH BEHAVIORAL DISTURBANCE (H): Primary | ICD-10-CM

## 2019-01-01 DIAGNOSIS — M1A.09X0 IDIOPATHIC CHRONIC GOUT OF MULTIPLE SITES WITHOUT TOPHUS: ICD-10-CM

## 2019-01-01 DIAGNOSIS — B35.6 TINEA CRURIS: ICD-10-CM

## 2019-01-01 DIAGNOSIS — M1A.09X1 IDIOPATHIC CHRONIC GOUT OF MULTIPLE SITES WITH TOPHUS: ICD-10-CM

## 2019-01-01 DIAGNOSIS — B35.6 TINEA CRURIS: Primary | ICD-10-CM

## 2019-01-01 DIAGNOSIS — M19.91 PRIMARY OSTEOARTHRITIS, UNSPECIFIED SITE: Primary | ICD-10-CM

## 2019-01-01 DIAGNOSIS — M1A.09X1 IDIOPATHIC CHRONIC GOUT OF MULTIPLE SITES WITH TOPHUS: Primary | ICD-10-CM

## 2019-01-01 DIAGNOSIS — F32.A DEPRESSION, UNSPECIFIED DEPRESSION TYPE: ICD-10-CM

## 2019-01-01 DIAGNOSIS — Z71.89 ACP (ADVANCE CARE PLANNING): Chronic | ICD-10-CM

## 2019-01-01 DIAGNOSIS — R29.6 MULTIPLE FALLS: ICD-10-CM

## 2019-01-01 DIAGNOSIS — J20.9 ACUTE BRONCHITIS, UNSPECIFIED ORGANISM: Primary | ICD-10-CM

## 2019-01-01 DIAGNOSIS — G30.9 ALZHEIMER'S DEMENTIA WITHOUT BEHAVIORAL DISTURBANCE, UNSPECIFIED TIMING OF DEMENTIA ONSET: ICD-10-CM

## 2019-01-01 DIAGNOSIS — F02.818 LATE ONSET ALZHEIMER'S DISEASE WITH BEHAVIORAL DISTURBANCE (H): Primary | ICD-10-CM

## 2019-01-01 DIAGNOSIS — J20.9 ACUTE BRONCHITIS, UNSPECIFIED ORGANISM: ICD-10-CM

## 2019-01-01 DIAGNOSIS — Z71.89 ACP (ADVANCE CARE PLANNING): ICD-10-CM

## 2019-01-01 DIAGNOSIS — J40 BRONCHITIS: ICD-10-CM

## 2019-01-01 DIAGNOSIS — M19.91 PRIMARY OSTEOARTHRITIS, UNSPECIFIED SITE: ICD-10-CM

## 2019-01-01 LAB
ALBUMIN SERPL-MCNC: 4 G/DL (ref 3.5–5.7)
ALBUMIN UR-MCNC: NEGATIVE MG/DL
ALBUMIN UR-MCNC: NEGATIVE MG/DL
ALP SERPL-CCNC: 77 U/L (ref 34–104)
ALT SERPL W P-5'-P-CCNC: 9 U/L (ref 7–52)
ANION GAP SERPL CALCULATED.3IONS-SCNC: 7 MMOL/L (ref 3–14)
APPEARANCE UR: CLEAR
APPEARANCE UR: CLEAR
AST SERPL W P-5'-P-CCNC: 15 U/L (ref 13–39)
BACTERIA SPEC CULT: ABNORMAL
BACTERIA SPEC CULT: NORMAL
BACTERIA SPEC CULT: NORMAL
BASOPHILS # BLD AUTO: 0 10E9/L (ref 0–0.2)
BASOPHILS NFR BLD AUTO: 0.6 %
BILIRUB SERPL-MCNC: 0.6 MG/DL (ref 0.3–1)
BILIRUB UR QL STRIP: NEGATIVE
BILIRUB UR QL STRIP: NEGATIVE
BUN SERPL-MCNC: 23 MG/DL (ref 7–25)
CALCIUM SERPL-MCNC: 9 MG/DL (ref 8.6–10.3)
CHLORIDE SERPL-SCNC: 104 MMOL/L (ref 98–107)
CO2 SERPL-SCNC: 28 MMOL/L (ref 21–31)
COLOR UR AUTO: YELLOW
COLOR UR AUTO: YELLOW
CREAT SERPL-MCNC: 1.34 MG/DL (ref 0.7–1.3)
D DIMER PPP DDU-MCNC: 211 NG/ML D-DU (ref 0–230)
DIFFERENTIAL METHOD BLD: NORMAL
EOSINOPHIL # BLD AUTO: 0.2 10E9/L (ref 0–0.7)
EOSINOPHIL NFR BLD AUTO: 2.8 %
ERYTHROCYTE [DISTWIDTH] IN BLOOD BY AUTOMATED COUNT: 14.7 % (ref 10–15)
GFR SERPL CREATININE-BSD FRML MDRD: 50 ML/MIN/{1.73_M2}
GLUCOSE SERPL-MCNC: 101 MG/DL (ref 70–105)
GLUCOSE UR STRIP-MCNC: NEGATIVE MG/DL
GLUCOSE UR STRIP-MCNC: NEGATIVE MG/DL
GRAM STN SPEC: ABNORMAL
HCO3 BLDV-SCNC: 30 MMOL/L (ref 21–28)
HCT VFR BLD AUTO: 41.2 % (ref 40–53)
HGB BLD-MCNC: 13.4 G/DL (ref 13.3–17.7)
HGB UR QL STRIP: NEGATIVE
HGB UR QL STRIP: NEGATIVE
IMM GRANULOCYTES # BLD: 0.1 10E9/L (ref 0–0.4)
IMM GRANULOCYTES NFR BLD: 1.2 %
KETONES UR STRIP-MCNC: NEGATIVE MG/DL
KETONES UR STRIP-MCNC: NEGATIVE MG/DL
LACTATE SERPL-SCNC: 1 MMOL/L (ref 0.5–2.2)
LEUKOCYTE ESTERASE UR QL STRIP: NEGATIVE
LEUKOCYTE ESTERASE UR QL STRIP: NEGATIVE
LYMPHOCYTES # BLD AUTO: 1 10E9/L (ref 0.8–5.3)
LYMPHOCYTES NFR BLD AUTO: 15.2 %
MCH RBC QN AUTO: 30.1 PG (ref 26.5–33)
MCHC RBC AUTO-ENTMCNC: 32.5 G/DL (ref 31.5–36.5)
MCV RBC AUTO: 93 FL (ref 78–100)
MONOCYTES # BLD AUTO: 0.4 10E9/L (ref 0–1.3)
MONOCYTES NFR BLD AUTO: 6 %
NEUTROPHILS # BLD AUTO: 4.8 10E9/L (ref 1.6–8.3)
NEUTROPHILS NFR BLD AUTO: 74.2 %
NITRATE UR QL: NEGATIVE
NITRATE UR QL: NEGATIVE
NT-PROBNP SERPL-MCNC: 148 PG/ML (ref 0–100)
O2/TOTAL GAS SETTING VFR VENT: 0 %
OXYHGB MFR BLDV: 64 %
PCO2 BLDV: 60 MM HG (ref 40–50)
PH BLDV: 7.32 PH (ref 7.32–7.43)
PH UR STRIP: 5.5 PH (ref 5–9)
PH UR STRIP: 6.5 PH (ref 5–9)
PLATELET # BLD AUTO: 200 10E9/L (ref 150–450)
PO2 BLDV: 38 MM HG (ref 25–47)
POTASSIUM SERPL-SCNC: 4.2 MMOL/L (ref 3.5–5.1)
PROCALCITONIN SERPL-MCNC: 0.3 NG/ML
PROT SERPL-MCNC: 6.6 G/DL (ref 6.4–8.9)
RBC # BLD AUTO: 4.45 10E12/L (ref 4.4–5.9)
SODIUM SERPL-SCNC: 139 MMOL/L (ref 134–144)
SOURCE: NORMAL
SOURCE: NORMAL
SP GR UR STRIP: 1.01 (ref 1–1.03)
SP GR UR STRIP: 1.02 (ref 1–1.03)
SPECIMEN SOURCE: ABNORMAL
SPECIMEN SOURCE: ABNORMAL
SPECIMEN SOURCE: NORMAL
SPECIMEN SOURCE: NORMAL
TROPONIN I SERPL-MCNC: 0.04 UG/L (ref 0–0.03)
UROBILINOGEN UR STRIP-ACNC: 0.2 EU/DL (ref 0.2–1)
UROBILINOGEN UR STRIP-ACNC: 1 EU/DL (ref 0.2–1)
WBC # BLD AUTO: 6.5 10E9/L (ref 4–11)

## 2019-01-01 PROCEDURE — 87070 CULTURE OTHR SPECIMN AEROBIC: CPT | Performed by: FAMILY MEDICINE

## 2019-01-01 PROCEDURE — 93010 ELECTROCARDIOGRAM REPORT: CPT | Performed by: INTERNAL MEDICINE

## 2019-01-01 PROCEDURE — 84484 ASSAY OF TROPONIN QUANT: CPT | Performed by: FAMILY MEDICINE

## 2019-01-01 PROCEDURE — 81003 URINALYSIS AUTO W/O SCOPE: CPT | Performed by: FAMILY MEDICINE

## 2019-01-01 PROCEDURE — 94640 AIRWAY INHALATION TREATMENT: CPT

## 2019-01-01 PROCEDURE — 93005 ELECTROCARDIOGRAM TRACING: CPT | Performed by: FAMILY MEDICINE

## 2019-01-01 PROCEDURE — 85379 FIBRIN DEGRADATION QUANT: CPT | Performed by: FAMILY MEDICINE

## 2019-01-01 PROCEDURE — 80053 COMPREHEN METABOLIC PANEL: CPT | Performed by: FAMILY MEDICINE

## 2019-01-01 PROCEDURE — 99284 EMERGENCY DEPT VISIT MOD MDM: CPT | Mod: Z6 | Performed by: FAMILY MEDICINE

## 2019-01-01 PROCEDURE — 36415 COLL VENOUS BLD VENIPUNCTURE: CPT | Performed by: FAMILY MEDICINE

## 2019-01-01 PROCEDURE — 71045 X-RAY EXAM CHEST 1 VIEW: CPT

## 2019-01-01 PROCEDURE — 87077 CULTURE AEROBIC IDENTIFY: CPT | Performed by: FAMILY MEDICINE

## 2019-01-01 PROCEDURE — 40000313 ZZH STATISTIC SUCTION SPUTUM

## 2019-01-01 PROCEDURE — 84145 PROCALCITONIN (PCT): CPT | Performed by: FAMILY MEDICINE

## 2019-01-01 PROCEDURE — 40000275 ZZH STATISTIC RCP TIME EA 10 MIN

## 2019-01-01 PROCEDURE — 82805 BLOOD GASES W/O2 SATURATION: CPT | Performed by: FAMILY MEDICINE

## 2019-01-01 PROCEDURE — 83605 ASSAY OF LACTIC ACID: CPT | Performed by: FAMILY MEDICINE

## 2019-01-01 PROCEDURE — 87205 SMEAR GRAM STAIN: CPT | Performed by: FAMILY MEDICINE

## 2019-01-01 PROCEDURE — 25000132 ZZH RX MED GY IP 250 OP 250 PS 637: Mod: GY | Performed by: FAMILY MEDICINE

## 2019-01-01 PROCEDURE — 25000125 ZZHC RX 250: Performed by: FAMILY MEDICINE

## 2019-01-01 PROCEDURE — 83880 ASSAY OF NATRIURETIC PEPTIDE: CPT | Performed by: FAMILY MEDICINE

## 2019-01-01 PROCEDURE — 87040 BLOOD CULTURE FOR BACTERIA: CPT | Performed by: FAMILY MEDICINE

## 2019-01-01 PROCEDURE — 99285 EMERGENCY DEPT VISIT HI MDM: CPT | Mod: 25 | Performed by: FAMILY MEDICINE

## 2019-01-01 PROCEDURE — 85025 COMPLETE CBC W/AUTO DIFF WBC: CPT | Performed by: FAMILY MEDICINE

## 2019-01-01 RX ORDER — MELATONIN 3 MG
1 TABLET ORAL AT BEDTIME
Refills: 3 | COMMUNITY
Start: 2018-01-01 | End: 2019-01-01 | Stop reason: DRUGHIGH

## 2019-01-01 RX ORDER — NYSTATIN 100000 U/G
CREAM TOPICAL 2 TIMES DAILY
Qty: 30 G | Refills: 1 | Status: CANCELLED | OUTPATIENT
Start: 2019-01-01

## 2019-01-01 RX ORDER — ACETAMINOPHEN 325 MG/1
650 TABLET ORAL ONCE
Status: COMPLETED | OUTPATIENT
Start: 2019-01-01 | End: 2019-01-01

## 2019-01-01 RX ORDER — LORAZEPAM 1 MG/1
1 TABLET ORAL EVERY 6 HOURS PRN
Qty: 30 TABLET | Refills: 0 | Status: SHIPPED | OUTPATIENT
Start: 2019-01-01 | End: 2019-01-01

## 2019-01-01 RX ORDER — ACETAMINOPHEN 325 MG/1
650 TABLET ORAL EVERY 6 HOURS PRN
Status: CANCELLED | OUTPATIENT
Start: 2019-01-01

## 2019-01-01 RX ORDER — TOLNAFTATE 1 G/100G
POWDER TOPICAL 2 TIMES DAILY
Qty: 45 G | Refills: 3 | Status: SHIPPED | OUTPATIENT
Start: 2019-01-01

## 2019-01-01 RX ORDER — DIVALPROEX SODIUM 125 MG/1
125 TABLET, DELAYED RELEASE ORAL
Qty: 180 TABLET | Refills: 3 | Status: SHIPPED | OUTPATIENT
Start: 2019-01-01

## 2019-01-01 RX ORDER — LORAZEPAM 1 MG/1
1 TABLET ORAL EVERY 6 HOURS PRN
Qty: 30 TABLET | Refills: 0 | Status: SHIPPED | OUTPATIENT
Start: 2019-01-01

## 2019-01-01 RX ORDER — IPRATROPIUM BROMIDE AND ALBUTEROL SULFATE 2.5; .5 MG/3ML; MG/3ML
3 SOLUTION RESPIRATORY (INHALATION) ONCE
Status: COMPLETED | OUTPATIENT
Start: 2019-01-01 | End: 2019-01-01

## 2019-01-01 RX ORDER — CLOTRIMAZOLE 1 %
CREAM (GRAM) TOPICAL 2 TIMES DAILY
Qty: 113 G | Refills: 1 | Status: SHIPPED | OUTPATIENT
Start: 2019-01-01

## 2019-01-01 RX ORDER — ALLOPURINOL 100 MG/1
TABLET ORAL
Qty: 180 TABLET | Refills: 3 | Status: SHIPPED | OUTPATIENT
Start: 2019-01-01

## 2019-01-01 RX ORDER — CLONAZEPAM 0.5 MG/1
0.5 TABLET ORAL AT BEDTIME
Qty: 30 TABLET | Refills: 5
Start: 2019-01-01

## 2019-01-01 RX ORDER — ALLOPURINOL 100 MG/1
TABLET ORAL
Qty: 90 TABLET | Refills: 3 | Status: SHIPPED | OUTPATIENT
Start: 2019-01-01 | End: 2019-01-01

## 2019-01-01 RX ORDER — RISPERIDONE 1 MG/1
1 TABLET ORAL 3 TIMES DAILY
Qty: 90 TABLET | Refills: 5 | Status: SHIPPED | OUTPATIENT
Start: 2019-01-01

## 2019-01-01 RX ORDER — TRAZODONE HYDROCHLORIDE 100 MG/1
TABLET ORAL
Qty: 90 TABLET | Refills: 1 | Status: SHIPPED | OUTPATIENT
Start: 2019-01-01

## 2019-01-01 RX ORDER — ACETAMINOPHEN 500 MG
1000 TABLET ORAL 2 TIMES DAILY
Qty: 360 TABLET | Refills: 3 | COMMUNITY
Start: 2019-01-01

## 2019-01-01 RX ORDER — IPRATROPIUM BROMIDE AND ALBUTEROL SULFATE 2.5; .5 MG/3ML; MG/3ML
1 SOLUTION RESPIRATORY (INHALATION) EVERY 6 HOURS PRN
Qty: 1 BOX | Refills: 0 | Status: SHIPPED | OUTPATIENT
Start: 2019-01-01

## 2019-01-01 RX ORDER — IPRATROPIUM BROMIDE AND ALBUTEROL SULFATE 2.5; .5 MG/3ML; MG/3ML
3 SOLUTION RESPIRATORY (INHALATION) ONCE
Status: DISCONTINUED | OUTPATIENT
Start: 2019-01-01 | End: 2019-01-01

## 2019-01-01 RX ORDER — ACETAMINOPHEN 500 MG
500-1000 TABLET ORAL EVERY 8 HOURS
Qty: 360 TABLET | Refills: 3 | Status: SHIPPED | OUTPATIENT
Start: 2019-01-01 | End: 2019-01-01

## 2019-01-01 RX ORDER — RISPERIDONE 0.5 MG/1
0.5 TABLET, ORALLY DISINTEGRATING ORAL 2 TIMES DAILY
Qty: 60 TABLET | Refills: 5 | Status: SHIPPED | OUTPATIENT
Start: 2019-01-01 | End: 2019-01-01

## 2019-01-01 RX ORDER — RISPERIDONE 0.5 MG/1
TABLET ORAL
Qty: 90 TABLET | Refills: 3 | Status: SHIPPED | OUTPATIENT
Start: 2019-01-01 | End: 2019-01-01

## 2019-01-01 RX ORDER — COLCHICINE 0.6 MG/1
TABLET ORAL
Qty: 20 TABLET | Refills: 3 | OUTPATIENT
Start: 2019-01-01

## 2019-01-01 RX ORDER — LANOLIN ALCOHOL/MO/W.PET/CERES
3 CREAM (GRAM) TOPICAL 2 TIMES DAILY
Qty: 180 TABLET | Refills: 3 | Status: SHIPPED | OUTPATIENT
Start: 2019-01-01

## 2019-01-01 RX ORDER — AZITHROMYCIN 250 MG/1
TABLET, FILM COATED ORAL
Qty: 6 TABLET | Refills: 0 | Status: SHIPPED | OUTPATIENT
Start: 2019-01-01 | End: 2019-01-01

## 2019-01-01 RX ORDER — COLCHICINE 0.6 MG/1
TABLET ORAL
Qty: 20 TABLET | Refills: 3 | Status: SHIPPED | OUTPATIENT
Start: 2019-01-01

## 2019-01-01 RX ORDER — RISPERIDONE 0.5 MG/1
TABLET ORAL
Qty: 270 TABLET | Refills: 3 | Status: SHIPPED | OUTPATIENT
Start: 2019-01-01 | End: 2019-01-01

## 2019-01-01 RX ORDER — RISPERIDONE 0.5 MG/1
TABLET ORAL
Qty: 90 TABLET | Refills: 3 | Status: CANCELLED | OUTPATIENT
Start: 2019-01-01

## 2019-01-01 RX ORDER — MICONAZOLE NITRATE
POWDER (GRAM) MISCELLANEOUS 2 TIMES DAILY PRN
Qty: 100 G | Refills: 3 | Status: SHIPPED | OUTPATIENT
Start: 2019-01-01 | End: 2019-01-01

## 2019-01-01 RX ORDER — RISPERIDONE 0.5 MG/1
1 TABLET, ORALLY DISINTEGRATING ORAL ONCE
Status: COMPLETED | OUTPATIENT
Start: 2019-01-01 | End: 2019-01-01

## 2019-01-01 RX ORDER — COLCHICINE 0.6 MG/1
TABLET ORAL
Qty: 20 TABLET | Refills: 3 | Status: SHIPPED | OUTPATIENT
Start: 2019-01-01 | End: 2019-01-01

## 2019-01-01 RX ADMIN — ACETAMINOPHEN 650 MG: 325 TABLET, FILM COATED ORAL at 16:09

## 2019-01-01 RX ADMIN — IPRATROPIUM BROMIDE AND ALBUTEROL SULFATE 3 ML: .5; 3 SOLUTION RESPIRATORY (INHALATION) at 14:34

## 2019-01-01 RX ADMIN — RISPERIDONE 1 MG: 0.5 TABLET, ORALLY DISINTEGRATING ORAL at 16:09

## 2019-01-01 ASSESSMENT — MIFFLIN-ST. JEOR: SCORE: 1470.93

## 2019-01-08 NOTE — TELEPHONE ENCOUNTER
"ALLOPURINOL 100MG TABLET      Last Written Prescription Date:  01/24/18  Last Fill Quantity: 60,   # refills: 11  Last Office Visit: 11/19/18  Future Office visit: None scheduled  Routing refill request to provider for review/approval because:  Gout Agents Protocol Failed1/5 1:18 PM   No CBC on file in past 12 months    No ALT on file in past 12 months    No Uric Acid on file in past 12 months and value is less than 6    Normal serum creatinine on file in the past 12 months   Unable to fill medication because protocol states patient is due for labs above. LOV plan, \"Continue current medications.  If all goes well he will follow-up annually.\" Please review, fill, and sign as appropriate. Thank you!  Kathleen Hummel RN on 1/8/2019 at 1:50 PM    "

## 2019-02-08 NOTE — TELEPHONE ENCOUNTER
Routing refill request to provider for review/approval because:  Medication is reported/historical   CBC on file in past 12 months    ALT on file in past 12 months    Has Uric Acid on file in past 12 months and value is less than 6    Normal serum creatinine on file in the past 12 months     LOV: 11/19/18  Cydney Roberson RN on 2/8/2019 at 10:44 AM

## 2019-04-22 NOTE — TELEPHONE ENCOUNTER
Chart review shows that patient with a historical Rx in his chart as follows for Rx as requested:    Outpatient Medication Detail      Disp Refills Start End ZORAIDA   risperiDONE (RISPERDAL) 0.5 MG tablet 180 tablet 1 2018  No   Si in am, 1 in pm   Class: Historical   Order: [id number]   Printout Tracking     External Result Report   Medication Administration Instructions     2 in am, 1 in pm   Pharmacy     [id number] - [place], [place] - [address]     Request from pharmacy is for one tablet BID. Review of last office visit with PCP with Rx as noted above noted with no changes to dosing in office visit notes on that date. Writer will eunice up and route Rx request to PCP for his consideration/approval.    Unable to complete prescription refill per RN Medication Refill Policy. Cayden Gurrola 2019 2:20 PM

## 2019-04-23 NOTE — TELEPHONE ENCOUNTER
Contacted roberto at [facility] and she reports a new order for the patients resperidone came over as take two 0.5mg tablets in the am and one 0.5mg tablet at HS. The order used to be for two 0.5mg in the AM. They are wondering what the correct order is.  Carole Keen LPN on 4/23/2019 at 2:58 PM

## 2019-04-23 NOTE — TELEPHONE ENCOUNTER
[facility] and gave them the directions below.  Carole Keen LPN on 4/23/2019 at 4:06 PM

## 2019-05-20 NOTE — TELEPHONE ENCOUNTER
"Prescription approved per [facility] Refill Protocol.  LOV: 11/19/18  Per LOV:  \"Plan: Continue current medications.  If all goes well he will follow-up annually\"    Cydney Roberson RN on 5/20/2019 at 10:49 AM          "

## 2019-05-28 NOTE — TELEPHONE ENCOUNTER
"[id number] sent Rx request for the following:   CAN WE GET A NEW RX FOR Risperidone 0.5MG DAILY @ 2PM. Not on medication list with this direction.     Last Prescription:  risperiDONE (RISPERDAL) 0.5 MG tablet 270 tablet 3 2019  No   Sig: Take 2 tablets by mouth in the A.M. and one tablet by mouth in the P.M.     Called and spoke to staff at [facility], after verifying last name and date of birth. They noted José Miguel Mehta is requesting the above medication, with new sig: Take 1 (0.5 mg) tablet daily at 2 PM.    Per 19 telephone note, Dr. Garcia noted correct dose was \"2 in the morning, 1 in the evening.\" M.P. Was notified was notified. No changes noted since, per Pt's EMR.    Called and spoke with Sismaude at [facility] after verifying Pt's last name and . She states M.P. Received fax from Dr. Garcia on , with order to start taking 0.5 mg daily at 2 PM. Pharmacy will not send over new card, until a new prescription is received.    Above noted fax from , not found in Pt's EMR.    Will route to PCP to address.     Unable to complete prescription refill per RN Medication Refill Policy. Mehnaz Dennis RN .............. 2019  11:42 AM                    "

## 2019-05-28 NOTE — TELEPHONE ENCOUNTER
Pharmacy note:  CAN WE GET A NEW RX FOR 0.5MG DAILY @ 2PM     Note:  Not on medication list with this direction     Emelyn Johnson LPN........................5/28/2019  10:35 AM

## 2019-05-29 NOTE — TELEPHONE ENCOUNTER
Routing refill request to provider for review/approval because:  Drug not on the [facility] refill protocol     Last filled 9-24-18 for #100 g X 3 refills. LOV 11-19-18. Mehnaz Keen RN on 5/29/2019 at 10:20 AM

## 2019-05-30 NOTE — TELEPHONE ENCOUNTER
Requesting an order for Nystatin cream BID to groin area.  Is red in creases, testicles, and tip of penis till clear.    [place]

## 2019-05-30 NOTE — TELEPHONE ENCOUNTER
Nursing staff contacted and given the new medication verbal order. Nursing staff (Jenny) verbalized understanding and had no questions or concerns at this time.    Anh Echeverria LPN on 5/30/2019 at 10:07 AM

## 2019-06-14 NOTE — TELEPHONE ENCOUNTER
[facility] they report that they need a new script for the patients risperidone the two tablets at 10am of 0.5mg and one tablet at HS of 0.5mg. They report that the patient is out and needs one sent before the weekend.  Carole Keen, LPN on 6/14/2019 at 2:36 PM

## 2019-06-18 NOTE — TELEPHONE ENCOUNTER
Risperidone refilled on 6/14/19 #60 x 5 refills to [facility].    Cydney Roberson RN on 6/18/2019 at 8:55 AM

## 2019-06-18 NOTE — TELEPHONE ENCOUNTER
Routing refill request to provider for review/approval because:  Medication is reported/historical   CBC on file in past 12 months    ALT on file in past 12 months    Has Uric Acid on file in past 12 months and value is less than 6     LOV: 11/19/18  Cydney Roberson RN on 6/18/2019 at 7:55 AM

## 2019-06-20 NOTE — TELEPHONE ENCOUNTER
vanna Janine is wondering why risperidone changed.   Previous dosage was for Three times daily (1000 1 mg, 1400 and 1900 0.5mg).   Current dosage is BID. They would like it to be 3 times daily per patients daughter and facility. Please advise. Bibi Weller LPN .............6/20/2019  12:46 PM

## 2019-06-21 NOTE — TELEPHONE ENCOUNTER
Awa at [facility] contacted. She states that they are now looking to have the patient's risperidone dosage increased. She states that the patient is becoming increasingly combative with staff (kicking,biting,spitting etc). She states it has been taking 3 aides plus an lpn to get him toileted at night.     Awa would like to have Dr. Garcia do increase in meds, she states she will be faxing over documentation as well as the request today.     Anh Echeverria LPN on 6/21/2019 at 9:06 AM

## 2019-06-25 NOTE — TELEPHONE ENCOUNTER
Left a message for elvin at [facility] to call back.  Carole Keen LPN on 6/25/2019 at 1:21 PM

## 2019-06-27 NOTE — TELEPHONE ENCOUNTER
[facility] called and reports that patient has scheduled tylenol 1000mg every 8 hours but they are wondering he they could get a order for 650mg PRN. If Chip Garcia MD is ok with this please send in a new prescription.  Carole Keen LPN on 6/27/2019 at 10:45 AM

## 2019-06-27 NOTE — DISCHARGE INSTRUCTIONS
Oxygen as needed to keep oxygen saturations greater than 90 %   Duonebs every 4 hours as needed for chest congestion and wheezing   Zithromax for 5 days

## 2019-06-27 NOTE — ED AVS SNAPSHOT
[facility]  [address]  [place] [address]  Phone:  [phone]  Fax:  [phone]                                    Angelo Santizo   MRN: [id number]    Department:  [facility]   Date of Visit:  6/27/2019           After Visit Summary Signature Page    I have received my discharge instructions, and my questions have been answered. I have discussed any challenges I see with this plan with the nurse or doctor.    ..........................................................................................................................................  Patient/Patient Representative Signature      ..........................................................................................................................................  Patient Representative Print Name and Relationship to Patient    ..................................................               ................................................  Date                                   Time    ..........................................................................................................................................  Reviewed by Signature/Title    ...................................................              ..............................................  Date                                               Time          22EPIC Rev 08/18

## 2019-06-27 NOTE — TELEPHONE ENCOUNTER
MAF-spoke with daughter requesting work in for pt-chest congestion. Please call. Thank you.  Treva Rich

## 2019-06-27 NOTE — TELEPHONE ENCOUNTER
Called and spoke with patients daughter. Patients full name and  verified. Notified daughter that PCP is out for the remainder of today. Daughter was wanting a work in today. Daughter did not want to be transferred to scheduling. She stated that she was heading to facility now and she may bring him into ER if needed.     Kamilla Roca LPN on 2019 at 1:17 PM

## 2019-06-27 NOTE — TELEPHONE ENCOUNTER
Called and left message for caregiver to please return call.     Kamilla Roca LPN on 6/27/2019 at 11:09 AM

## 2019-06-27 NOTE — ED PROVIDER NOTES
History   No chief complaint on file.     HPI  Angelo Santizo is a 89 year old male who presents to ER by EMS with complaint of sob and audible wheezing. Daughter also states patient has had increasing falls. Patient with advanced dementia so other history not obtainable     Allergies:  Allergies   Allergen Reactions     Nsaids      Other reaction(s): GI Bleeding       Problem List:    Patient Active Problem List    Diagnosis Date Noted     Alzheimer's dementia 2018     Priority: Medium     Depression 2018     Priority: Medium     Insomnia 2018     Priority: Medium     Chronic idiopathic gout of multiple sites 10/04/2016     Priority: Medium     Acute upper GI bleed 2016     Priority: Medium     ACP (advance care planning) 04/15/2016     Priority: Medium     Advance Care Planning 4/15/2016: ACP Review of Chart / Resources Provided:  Reviewed chart for advance care plan.  Angelo Santizo has no plan or code status on file however states presence of ACP document. Copy requested. Confirmed code status reflects current choices pending receipt of document/advance care plan review.  Confirmed/documented legally designated decision makers.  Added by Alem May       Heart murmur 2016     Priority: Medium        Past Medical History:    Past Medical History:   Diagnosis Date     Alzheimer's disease      Gout      Insomnia      Major depressive disorder, single episode      UGI bleed        Past Surgical History:    No past surgical history on file.    Family History:    Family History   Problem Relation Age of Onset     Dementia Brother         Lewy Body      Other - See Comments Mother         Pneumonia     Other - See Comments Father         Old age       Social History:  Marital Status:   [2]  Social History     Tobacco Use     Smoking status: Former Smoker     Types: Cigarettes     Last attempt to quit: 1985     Years since quittin.9     Smokeless tobacco: Former User      Types: Chew     Quit date: 5/9/2016   Substance Use Topics     Alcohol use: No     Alcohol/week: 0.0 oz     Drug use: No     Comment: Drug use: No        Medications:      acetaminophen (TYLENOL) 325 MG tablet   allopurinol (ZYLOPRIM) 100 MG tablet   colchicine (COLCYRS) 0.6 MG tablet   furosemide (LASIX) 20 MG tablet   LORazepam (ATIVAN) 1 MG tablet   melatonin 3 MG tablet   miconazole (MICATIN/MICRO GUARD) 2 % external powder   Miconazole Nitrate POWD powder   risperiDONE (RISPERDAL) 1 MG tablet   sertraline (ZOLOFT) 50 MG tablet   traZODone (DESYREL) 100 MG tablet         Review of Systems   Unable to perform ROS: Dementia       Physical Exam          Physical Exam   Constitutional: He appears well-developed and well-nourished.   HENT:   Head: Normocephalic and atraumatic.   Neck: No JVD present. No tracheal deviation present.   Cardiovascular: Normal rate.   Pulmonary/Chest:   Coarse rhonchi bilaterally    Abdominal: Soft. Bowel sounds are normal. He exhibits no distension and no mass. There is no tenderness. There is no rebound and no guarding. No hernia.   Musculoskeletal: Normal range of motion.   Lymphadenopathy:     He has no cervical adenopathy.   Neurological: He is alert.   Skin: Skin is warm.   Nursing note and vitals reviewed.      ED Course        Procedures          EKG showing sinus bradycardia . First degree AV block LAD RBB . No acute ST segment changes and no acute changes from previous  EKG       Patient presents to ER by EMS from nursing home with concern of sob and wheezing . Patient triaged to exam room. Patient without hypoxia. Patient with congested breath sounds.  History and exam completed but limited secondary to severe dementia. Labs and diagnostics ordered. Chest xray without acute infiltrate. CBC normal . INflammatory markers not suggestive of SIRS. UA not suggestive of UTI . Patient with persistent congested cough despite negative chest xray . Patient with possible enlarged  thoracic aorta but uncertain due to portable technique on xray . Discussed options for managemement with family member who is present . Given advanced dementia prefer conservative management. Patient appears comfortable. Will discharge back to [facility] with prescription for zithromax, oxygyen and nebulizer therapys as needed. Return to ER as needed for any worsening of symptoms or concerns .    Results for orders placed or performed during the hospital encounter of 06/27/19   XR Chest Port 1 View    Narrative    XR CHEST PORT 1 VW    HISTORY: 89 yearsMale sob    TECHNIQUE: A single view of the chest was performed    COMPARISON: 1/27/2016    FINDINGS: There is atherosclerotic disease of the thoracic aorta.  There is enlargement of the thoracic aortic arch. Size appears larger  than that of the 2016 exam however there is a difference in technique.  Graft pleural plaques are present. Lung volumes are low. Lungs  otherwise clear      Impression    IMPRESSION: Atherosclerotic disease of the thoracic aorta with  possible aneurysm. The aortic arch is larger than that of the prior  study however this could be due to portable technique.    Pleural plaques are present. Lungs are otherwise clear.    Lung volumes are low.    WINSTON ORANTES MD   CBC with platelets differential   Result Value Ref Range    WBC 6.5 4.0 - 11.0 10e9/L    RBC Count 4.45 4.4 - 5.9 10e12/L    Hemoglobin 13.4 13.3 - 17.7 g/dL    Hematocrit 41.2 40.0 - 53.0 %    MCV 93 78 - 100 fl    MCH 30.1 26.5 - 33.0 pg    MCHC 32.5 31.5 - 36.5 g/dL    RDW 14.7 10.0 - 15.0 %    Platelet Count 200 150 - 450 10e9/L    Diff Method Automated Method     % Neutrophils 74.2 %    % Lymphocytes 15.2 %    % Monocytes 6.0 %    % Eosinophils 2.8 %    % Basophils 0.6 %    % Immature Granulocytes 1.2 %    Absolute Neutrophil 4.8 1.6 - 8.3 10e9/L    Absolute Lymphocytes 1.0 0.8 - 5.3 10e9/L    Absolute Monocytes 0.4 0.0 - 1.3 10e9/L    Absolute Eosinophils 0.2 0.0 - 0.7 10e9/L     Absolute Basophils 0.0 0.0 - 0.2 10e9/L    Abs Immature Granulocytes 0.1 0 - 0.4 10e9/L   Comprehensive metabolic panel   Result Value Ref Range    Sodium 139 134 - 144 mmol/L    Potassium 4.2 3.5 - 5.1 mmol/L    Chloride 104 98 - 107 mmol/L    Carbon Dioxide 28 21 - 31 mmol/L    Anion Gap 7 3 - 14 mmol/L    Glucose 101 70 - 105 mg/dL    Urea Nitrogen 23 7 - 25 mg/dL    Creatinine 1.34 (H) 0.70 - 1.30 mg/dL    GFR Estimate 50 (L) >60 mL/min/[1.73_m2]    GFR Estimate If Black 60 (L) >60 mL/min/[1.73_m2]    Calcium 9.0 8.6 - 10.3 mg/dL    Bilirubin Total 0.6 0.3 - 1.0 mg/dL    Albumin 4.0 3.5 - 5.7 g/dL    Protein Total 6.6 6.4 - 8.9 g/dL    Alkaline Phosphatase 77 34 - 104 U/L    ALT 9 7 - 52 U/L    AST 15 13 - 39 U/L   Blood gas venous and oxyhgb   Result Value Ref Range    Ph Venous 7.32 7.32 - 7.43 pH    PCO2 Venous 60 (H) 40 - 50 mm Hg    PO2 Venous 38 25 - 47 mm Hg    Bicarbonate Venous 30 (H) 21 - 28 mmol/L    FIO2 0     Oxyhemoglobin Venous 64 %   Lactic acid   Result Value Ref Range    Lactic Acid 1.0 0.5 - 2.2 mmol/L   Procalcitonin   Result Value Ref Range    Procalcitonin 0.30 ng/ml   UA reflex to Microscopic and Culture   Result Value Ref Range    Color Urine Yellow     Appearance Urine Clear     Glucose Urine Negative NEG^Negative mg/dL    Bilirubin Urine Negative NEG^Negative    Ketones Urine Negative NEG^Negative mg/dL    Specific Gravity Urine 1.015 1.000 - 1.030    Blood Urine Negative NEG^Negative    pH Urine 6.5 5.0 - 9.0 pH    Protein Albumin Urine Negative NEG^Negative mg/dL    Urobilinogen Urine 0.2 0.2 - 1.0 EU/dL    Nitrite Urine Negative NEG^Negative    Leukocyte Esterase Urine Negative NEG^Negative    Source Catheterized Urine    Troponin I (now)   Result Value Ref Range    Troponin I ES 0.038 (H) 0.000 - 0.034 ug/L   NT pro BNP   Result Value Ref Range    N-Terminal Pro BNP Inpatient 148 (H) 0 - 100 pg/mL   D-Dimer (HI,[facility])   Result Value Ref Range    D-Dimer ng/mL 211 0 - 230 ng/ml D-DU            No results found for this or any previous visit (from the past 24 hour(s)).    Medications - No data to display    Assessments & Plan (with Medical Decision Making)     I have reviewed the nursing notes.    I have reviewed the findings, diagnosis, plan and need for follow up with the patient.         Medication List      There are no discharge medications for this visit.         Final diagnoses:   Acute bronchitis, unspecified organism   Alzheimer's dementia without behavioral disturbance, unspecified timing of dementia onset       6/27/2019   [facility] Caroline Marie MD  06/27/19 1747

## 2019-06-27 NOTE — ED TRIAGE NOTES
Pt to ER via EMS from [facility] c/o SOB and audible wheezing.  Febrile for EMS at 101.6. Pt has dementia.  Daughter to ER on pt arrival. O2 on arrival 88% O2 via NC applied at 2L.

## 2019-06-27 NOTE — ED NOTES
Discharge instructions reviewed with pt's daughter and report also called to JAKE Yang @ [facility].  Pt's daughter requests to bring him home via W/C due to increased agitation, says his behaviors will continue to escalate if he has to sit in the room for much longer.  Pt transferred to W/C with assist X 3.

## 2019-06-28 NOTE — TELEPHONE ENCOUNTER
The patient was seen in the ER yesterday for low stats and was given ipratropium every 6 hours PRN. They need a prescription for the machine and a mask to administer sent to [facility]. The order has been teed up below.  Carole Keen LPN on 6/28/2019 at 11:10 AM

## 2019-06-28 NOTE — TELEPHONE ENCOUNTER
Calling for prescription for nebulizer machine and mask.  Also still waiting on answer for Tylenol

## 2019-06-28 NOTE — TELEPHONE ENCOUNTER
Awa at [facility] was contacted and given the information below.  Carole Keen LPN on 6/28/2019 at 11:11 AM

## 2019-06-28 NOTE — TELEPHONE ENCOUNTER
Awa from [facility] stated that livan atkinson needs a prescription for the 1000mg tylenol every 8 hours. They need to know quantity and refills on this. The order is teed up below.  Carole Keen LPN on 6/28/2019 at 2:57 PM

## 2019-06-29 NOTE — TELEPHONE ENCOUNTER
[facility]:    [place] ED lab result protocol used  General culture    Reason for call  Notify of lab results, assess symptoms,  review ED providers recommendations/discharge instructions (if necessary) and advise per ED lab result f/u protocol    Lab Result   Final SPUTUM culture report on 6/29/19  Emergency Dept discharge antibiotic prescribed: azithromycin (ZITHROMAX) 250 MG tablet, 500 mg day 1 then 250 mg days 2-5  #1. Bacteria, Enterobacter aerogenes , which is [NOT TESTED] to ED discharge antibiotic  Patient to be notified of result, symptoms's assessed and advised per [place] ED lab result protocol  Information table from ED Provider visit on 6/27/19  Symptoms reported at ED visit (Chief complaint, HPI) HPI  Angelo Santizo is a 89 year old male who presents to ER by EMS with complaint of sob and audible wheezing. Daughter also states patient has had increasing falls. Patient with advanced dementia so other history not obtainable    ED providers Impression and Plan (applicable information) Patient presents to ER by EMS from nursing home with concern of sob and wheezing . Patient triaged to exam room. Patient without hypoxia. Patient with congested breath sounds.  History and exam completed but limited secondary to severe dementia. Labs and diagnostics ordered. Chest xray without acute infiltrate. CBC normal . INflammatory markers not suggestive of SIRS. UA not suggestive of UTI . Patient with persistent congested cough despite negative chest xray . Patient with possible enlarged thoracic aorta but uncertain due to portable technique on xray . Discussed options for managemement with family member who is present . Given advanced dementia prefer conservative management. Patient appears comfortable. Will discharge back to [facility] with prescription for zithromax, oxygyen and nebulizer therapys as needed. Return to ER as needed for any worsening  "of symptoms or concerns .     Miscellaneous information NA     RN Assessment (Patient s current Symptoms), include time called.  [Insert Left message here if message left]  3:18PM: Spoke with the assisted living nurse Celia at [facility]. She states that the Patient might be \"slightly\" better, has been keeping his O2 sats over 88% on room air. Is taking the antibiotic without difficulty. \"He hasn't been doing a lot.\" \"He's been coughing bad and wheezing\" Denies fever.  Celia states that they have not been able to get the nebulizer, so the Patient has not received any nebs, the patient would have to rent a nebulizer or have insurance pay for one. Celia is going to check if the pharmacy is open and would be able to supply a nebulizer today. She will call this RN back.    RN Recommendations/Instructions per [place] ED lab result protocol  Patient's nurse Celia notified of lab result. Final sputum culture faxed to Celia at fax [phone]    3:54PM: Celia called this RN back. She was able to get the pharmacy to send over a nebulizer and medication which she will receive in about an hour.    Celia has no further questions or concerns at this time.     Please Contact your PCP clinic or return to the Emergency department if your:    Symptoms worsen or other concerning symptom's.    PCP follow-up Questions asked: YES       [RN Name]  Angelita Savage RN  [facility] RN  Lung Nodule and ED Lab Result RN  Epic pool (ED late result f/u RN): [id number]  [facility] INCIDENTAL RADIOLOGY F/U NURSES: P [id number]  Ph# [phone]      Copy of Lab result   Sputum Culture Aerobic Bacterial [[id number]] (Order [id number])   Exam Information     Exam Date Exam Time Accession # Results    6/27/19  3:06 PM [id number]    Component Results     Specimen Information: Sputum        Component Collected Lab   Specimen Description 06/27/2019  3:06 PM [facility]   Sputum    Culture Micro Abnormal  06/27/2019  3:06 PM [facility]   Enterobacter " aerogenes   Some antibiotics have been suppressed due to the known inducible beta lactamase activity.   Please contact Microbiology for more information.    Susceptibility     Enterobacter aerogenes     Antibiotic Interpretation Sensitivity Method Status    AMPICILLIN Resistant >16 ug/mL DAMON Final    CEFEPIME Sensitive <=8 ug/mL DAMON Final    CIPROFLOXACIN Sensitive <=1 ug/mL DAMON Final    GENTAMICIN Sensitive <=4 ug/mL DAMON Final    IMIPENEM Sensitive 2 ug/mL DAMON Final    LEVOFLOXACIN Sensitive <=2 ug/mL DAMON Final    TETRACYCLINE Sensitive <=4 ug/mL DAMON Final    Trimethoprim/Sulfa Sensitive <=2/38 ug/mL DAMON Final

## 2019-07-09 NOTE — TELEPHONE ENCOUNTER
[facility] sent a fax requesting a refill on the patients ativan 1mg. The patient only has 7 tablets left. The order has been teed up below.  Carole Keen LPN on 7/9/2019 at 2:32 PM

## 2019-07-10 PROBLEM — R29.6 MULTIPLE FALLS: Status: ACTIVE | Noted: 2019-01-01

## 2019-07-10 NOTE — TELEPHONE ENCOUNTER
Incoming fax  Antifungal 1% topical powder TInactin powder is unavailable on long term backorder. [facility] would like a cream prescribed instead. They have clotrimazole cream available.    Lorelei Hart LPN on 7/10/2019 at 1:20 PM

## 2019-07-11 NOTE — PROGRESS NOTES
"[facility] \[facility] [place] [facility]\"" - [facility]    Angelo Santizo  : 3/6/1928  MRN: [id number]  Primary Care Physician: Rosalba Ruiz  [facility].     7/10/2019    HPI:  Angelo Santizo is a 89 year old male with significant dementia who is being seen today at [facility] to establish care with this provider. His daughter, Dolores, is here for today's visit and states she was very happy with her dad's clinic provider however with patient's dementia progression, it has become too difficult to get him to the clinic.     Prior to establishing care, Dolores does want me to know her wishes for her father's remaining time.   Dolores states the goals of care for her father are comfort focused. Her wish is for him to remain here at  and for him to receive his cares here for the remainder of his days.      Dolores reports that patient's condition has declined significantly over the last several weeks. She believes he may have had \"tiny strokes,\" but does not want work up for this. Patient has been at [facility] for some time and at this baseline, he was ambulatory, interactive, friendly, and mostly cooperative. Associated with his recent decline, patient is no longer walking, affect and facial expressiveness are diminished, and he is more resistant with cares. He now needs a wc for mobility. Staff report he seems to get restless when sitting in his wc or lying in bed and attempts to get up on his own and subsequently has had multiple falls.    He was also seen in the ED 2 weeks ago for bronchitis and treated with oral antibiotic and nebs, now resolved.   With his decline in mobility, multiple falls, and progressing dementia, he is now using a wc and has been using a facility wc. He does need a wc for safe mobility and transport. This is a Face-to-Face for manual wheelchair.     Dolores iterates that with comfort focused goals, she does not want him " sent to the ED unless his symptoms and comfort goals cannot be managed here at . She states his quality of life is limited and wants no life sustaining or life prolonging measures.   With his significant dementia and recent decline, I am in agreement with daughter's goal of care to support him the best we are able to for ease and comfort and have his care needs met.      Medication reconciliation: completed.     CODE STATUS: DNR / DNI    Patient Active Problem List    Diagnosis Date Noted     Multiple falls 07/10/2019     Priority: Medium     Alzheimer's dementia 02/28/2018     Priority: Medium     Depression 02/28/2018     Priority: Medium     Insomnia 02/28/2018     Priority: Medium     Chronic idiopathic gout of multiple sites 10/04/2016     Priority: Medium     Acute upper GI bleed 09/02/2016     Priority: Medium     ACP (advance care planning) 04/15/2016     Priority: Medium     Advance Care Planning 4/15/2016: ACP Review of Chart / Resources Provided:  Reviewed chart for advance care plan.  Angelo Santizo has no plan or code status on file however states presence of ACP document. Copy requested. Confirmed code status reflects current choices pending receipt of document/advance care plan review.  Confirmed/documented legally designated decision makers.  Added by Alem May       Heart murmur 01/27/2016     Priority: Medium     Past Medical History:   Diagnosis Date     Alzheimer's disease     No Comments Provided     Gout     No Comments Provided     Insomnia     No Comments Provided     Major depressive disorder, single episode     No Comments Provided     UGI bleed     Secondary to NSAIDS     Social History     Socioeconomic History     Marital status:      Spouse name: Not on file     Number of children: Not on file     Years of education: Not on file     Highest education level: Not on file   Occupational History     Not on file   Social Needs     Financial resource strain: Not on file      Food insecurity:     Worry: Not on file     Inability: Not on file     Transportation needs:     Medical: Not on file     Non-medical: Not on file   Tobacco Use     Smoking status: Former Smoker     Types: Cigarettes     Last attempt to quit: 1985     Years since quittin.9     Smokeless tobacco: Former User     Types: Chew     Quit date: 2016   Substance and Sexual Activity     Alcohol use: No     Alcohol/week: 0.0 oz     Drug use: No     Comment: Drug use: No     Sexual activity: Not on file   Lifestyle     Physical activity:     Days per week: Not on file     Minutes per session: Not on file     Stress: Not on file   Relationships     Social connections:     Talks on phone: Not on file     Gets together: Not on file     Attends Buddhist service: Not on file     Active member of club or organization: Not on file     Attends meetings of clubs or organizations: Not on file     Relationship status: Not on file     Intimate partner violence:     Fear of current or ex partner: Not on file     Emotionally abused: Not on file     Physically abused: Not on file     Forced sexual activity: Not on file   Other Topics Concern     Not on file   Social History Narrative     2017, retired from iron work, 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  Moved to memory care at [facility] with wife.  From 2016.     Family History   Problem Relation Age of Onset     Dementia Brother         Lewy Body      Other - See Comments Mother         Pneumonia     Other - See Comments Father         Old age       Current Outpatient Medications on File Prior to Visit:  acetaminophen (TYLENOL) 500 MG tablet Take 2 tablets (1,000 mg) by mouth 2 times daily   allopurinol (ZYLOPRIM) 100 MG tablet TAKE 2 TABS (200MG) BY MOUTH ONCE DAILY   colchicine (COLCYRS) 0.6 MG tablet TAKE 1 TAB BY MOUTH TWICE A DAY AS NEEDED FOR GOUT   furosemide (LASIX) 20 MG tablet Take 1 tablet (20 mg) by mouth daily   ipratropium - albuterol  "0.5 mg/2.5 mg/3 mL (DUONEB) 0.5-2.5 (3) MG/3ML neb solution Take 1 vial (3 mLs) by nebulization every 6 hours as needed for shortness of breath / dyspnea or wheezing   LORazepam (ATIVAN) 1 MG tablet Take 1 tablet (1 mg) by mouth every 6 hours as needed for anxiety   Miconazole Nitrate POWD powder Apply topically 2 times daily as needed (rash)   risperiDONE (RISPERDAL) 1 MG tablet Take 1 tablet (1 mg) by mouth 3 times daily   sertraline (ZOLOFT) 50 MG tablet TAKE 1 AND 1/2 TABS (75MG) BY MOUTH ONCE DAILY   tolnaftate (TINACTIN) 1 % external powder Apply topically 2 times daily   traZODone (DESYREL) 100 MG tablet TAKE 1 TABLET BY MOUTH NIGHTLY AT BEDTIME   acetaminophen (TYLENOL) 325 MG tablet Take 325-650 mg by mouth every 6 hours as needed for pain   order for DME Equipment being ordered: nebulizer with mask     No current facility-administered medications on file prior to visit.   Allergies   Allergen Reactions     Nsaids      Other reaction(s): GI Bleeding       Review of systems:  Constitutional: decline in condition; no weight loss, fever, night sweats. Sleeping poorly at night, chronic \"night-owl.\" Multiple falls. He does deny any pain.   Function: wc dependent, needs assist with wc mobility, incontinent of bowel and bladder. Does have hospital bed.   Nutrition: good appetite, adequate water intake  Skin: scab to right toe from recent fall  Head/Eyes/Ears/Nose/Throat: no report of headache, no new vision problems, no nasal discharge or sore throat.  Cardiovascular: no chest pains or palpitations  Respiratory: no cough or shortness of breath  Gastrointestinal: no abdominal pain, nausea, vomiting, or change in bowel habits; incontinent of bowel  Genitourinary: no change in urination, no frequency, urgency, dysuria, or blood in urine; incontinent of urine  Neurologic: significant dementia  Psychiatric: increased difficult behaviors, kicking and biting, worse in evenings and with cares; impulsive.       PHYSICAL " EXAM:  Vitals per Nursing staff: Vitals: /58   Pulse 58   Temp 97.6  F (36.4  C)   Resp 18   SpO2 91% RA  GENERAL APPEARANCE: Well developed elderly, white male, in no acute distress, awake and confused. Sitting in wc.   SKIN: Warm and dry. Small dry scab to tip of right 3rd toe.  HEENT: Head normocephalic, atraumatic. Eyes without redness, drainage, or discharge. Nose without drainage, lips and mouth moist. Neck is supple.  LUNGS: Normal respirations, lung fields are clear to auscultation, no wheezes or rales.   HEART: Regular rhythm and rate; S1 and S2, no murmur, gallop or rub  ABDOMEN: Bowel sounds normal; Abdomen soft, no tenderness or guarding  NEUROLOGIC: Confused.  EXTREMITIES: No bilateral lower extremity edema.   PSYCHIATRIC: Not talkative; he is cooperative today.       ASSESSMENT / PLAN:  Patient is seen today at [facility] memory unit to establish care. Goals of care were discussed with his daughter, Dolores, whose wishes are comfort measures only. Dolores knows the risks of antipsychotic use and early death and increased risk of mortality but her wishes are for quality over quantity or duration of life and wishes for him to remain at the current dose.   Dolores is also considering Hospice option.     1. Late onset Alzheimer's disease with behavioral disturbance    2. Depression, unspecified depression type    3. Multiple falls    4. Idiopathic chronic gout of multiple sites without tophus    5. Primary osteoarthritis, unspecified site    6. Insomnia, unspecified type    7. ACP (advance care planning)    8. Declining mobility    - patient is having increasing difficult behaviors, worse later in the day consistent with sundowning. Will see if adding a dose of melatonin in the afternoon helps his behaviors.   - not sleeping well at night, restless, agitated. Will add clonazepam to HS regimen and see if we can improve his sleep at night.   - add depakote with goal of  mood stabilization.   - melatonin 3 MG tablet; Take 1 tablet (3 mg) by mouth 2 times daily Take at 14:00 and HS.  Dispense: 180 tablet; Refill: 3  - clonazePAM (KLONOPIN) 0.5 MG tablet; Take 1 tablet (0.5 mg) by mouth At Bedtime  Dispense: 30 tablet; Refill: 5  - divalproex sodium delayed-release (DEPAKOTE) 125 MG DR tablet; Take 1 tablet (125 mg) by mouth 2 times daily  Dispense: 180 tablet; Refill: 3  - this is a Face-to-Face for manual wheelchair for safe mobility and transport. PT to evaluate for wc, [employer] to treat for proper fit and positioning.     F/up in one week for mood, behaviors.     Rosalba Ruiz, CNP

## 2019-07-17 PROBLEM — Z74.09 DECLINING MOBILITY: Status: ACTIVE | Noted: 2019-01-01

## 2019-07-18 NOTE — PROGRESS NOTES
[facility] [place] [facility] CARE    Angelo Santizo  : 3/6/1928  MRN: [id number]  Primary Care Physician: Rosalba Ruiz  [facility] [[email or web address]     2019     HPI:  Angelo Santizo is a 89 year old male with significant dementia who is being seen today at [facility] for follow up. He has severe dementia with increasing behaviors, agitation, restlessness, and multiple falls. Family's goal is comfort measures only.     Patient was started on depakote and melatonin last week for increasing difficult behaviors; clonazepam and melatonin at  for restlessness and agitation. He had had multiple falls prior to being seen last week.     Currently facility care staff report he only had two falls over the last week without obvious injury. He has been less restless, agitation also less, and he has been sleeping at night.    Hospice has been contacted. With his end-stage dementia and overall decline, I suspect he has less than 6 months to live.     Angelo is at the table. He has visitors who are assisting him with feeding and he is eating. He does not talk to me today.     Medication reconciliation: completed.     CODE STATUS: DNR / DNI, comfort measures only    Patient Active Problem List    Diagnosis Date Noted     Multiple falls 07/10/2019     Priority: Medium     Declining mobility 2019     Priority: Medium     Alzheimer's dementia 2018     Priority: Medium     Depression 2018     Priority: Medium     Insomnia 2018     Priority: Medium     Chronic idiopathic gout of multiple sites 10/04/2016     Priority: Medium     Acute upper GI bleed 2016     Priority: Medium     ACP (advance care planning) 04/15/2016     Priority: Medium     Advance Care Planning 4/15/2016: ACP Review of Chart / Resources Provided:  Reviewed chart for advance care plan.  Angelo Santizo has no plan or code status on file however states presence of ACP document.  Copy requested. Confirmed code status reflects current choices pending receipt of document/advance care plan review.  Confirmed/documented legally designated decision makers.  Added by Alem May       Heart murmur 2016     Priority: Medium     Past Medical History:   Diagnosis Date     Alzheimer's disease     No Comments Provided     Gout     No Comments Provided     Insomnia     No Comments Provided     Major depressive disorder, single episode     No Comments Provided     UGI bleed     Secondary to NSAIDS     Social History     Socioeconomic History     Marital status:      Spouse name: Not on file     Number of children: Not on file     Years of education: Not on file     Highest education level: Not on file   Occupational History     Not on file   Social Needs     Financial resource strain: Not on file     Food insecurity:     Worry: Not on file     Inability: Not on file     Transportation needs:     Medical: Not on file     Non-medical: Not on file   Tobacco Use     Smoking status: Former Smoker     Types: Cigarettes     Last attempt to quit: 1985     Years since quittin.9     Smokeless tobacco: Former User     Types: Chew     Quit date: 2016   Substance and Sexual Activity     Alcohol use: No     Alcohol/week: 0.0 oz     Drug use: No     Comment: Drug use: No     Sexual activity: Not on file   Lifestyle     Physical activity:     Days per week: Not on file     Minutes per session: Not on file     Stress: Not on file   Relationships     Social connections:     Talks on phone: Not on file     Gets together: Not on file     Attends Jain service: Not on file     Active member of club or organization: Not on file     Attends meetings of clubs or organizations: Not on file     Relationship status: Not on file     Intimate partner violence:     Fear of current or ex partner: Not on file     Emotionally abused: Not on file     Physically abused: Not on file     Forced sexual  activity: Not on file   Other Topics Concern     Not on file   Social History Narrative     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  Moved to memory care at [facility] with wife.  From [place] 2016.     Family History   Problem Relation Age of Onset     Dementia Brother         Lewy Body      Other - See Comments Mother         Pneumonia     Other - See Comments Father         Old age       Current Outpatient Medications on File Prior to Visit:  acetaminophen (TYLENOL) 325 MG tablet Take 325-650 mg by mouth every 6 hours as needed for pain   acetaminophen (TYLENOL) 500 MG tablet Take 2 tablets (1,000 mg) by mouth 2 times daily   allopurinol (ZYLOPRIM) 100 MG tablet TAKE 2 TABS (200MG) BY MOUTH ONCE DAILY   clonazePAM (KLONOPIN) 0.5 MG tablet Take 1 tablet (0.5 mg) by mouth At Bedtime   clotrimazole (LOTRIMIN) 1 % external cream Apply topically 2 times daily   colchicine (COLCYRS) 0.6 MG tablet TAKE 1 TAB BY MOUTH TWICE A DAY AS NEEDED FOR GOUT   divalproex sodium delayed-release (DEPAKOTE) 125 MG DR tablet Take 1 tablet (125 mg) by mouth 2 times daily   furosemide (LASIX) 20 MG tablet Take 1 tablet (20 mg) by mouth daily   ipratropium - albuterol 0.5 mg/2.5 mg/3 mL (DUONEB) 0.5-2.5 (3) MG/3ML neb solution Take 1 vial (3 mLs) by nebulization every 6 hours as needed for shortness of breath / dyspnea or wheezing   LORazepam (ATIVAN) 1 MG tablet Take 1 tablet (1 mg) by mouth every 6 hours as needed for anxiety   melatonin 3 MG tablet Take 1 tablet (3 mg) by mouth 2 times daily Take at 14:00 and HS.   risperiDONE (RISPERDAL) 1 MG tablet Take 1 tablet (1 mg) by mouth 3 times daily   sertraline (ZOLOFT) 50 MG tablet TAKE 1 AND 1/2 TABS (75MG) BY MOUTH ONCE DAILY   tolnaftate (TINACTIN) 1 % external powder Apply topically 2 times daily   traZODone (DESYREL) 100 MG tablet TAKE 1 TABLET BY MOUTH NIGHTLY AT BEDTIME   order for DME Equipment being ordered: Wheelchair, manual.   order  for DME Equipment being ordered: nebulizer with mask     No current facility-administered medications on file prior to visit.   Allergies   Allergen Reactions     Nsaids      Other reaction(s): GI Bleeding       Review of systems:  Constitutional: ongoing decline, minimally verbal now. Sleeping improved since starting melatonin and clonazepam. No fever or chills.   Skin: redness to groin, recurrent  Lungs: occasional congested cough  : incontinent of urine  GI: incontinent of bowel  Psychiatric: no violent or aggressive behaviors since starting depakote      PHYSICAL EXAM:  Vitals per Nursing staff: Vitals: /70   Pulse 64   Temp 97.7  F (36.5  C)   Resp 16   Wt 80.6 kg (177 lb 9.6 oz)   SpO2 94%   BMI 26.23 kg/m  RA  GENERAL APPEARANCE: Well developed elderly, white male, in no acute distress, awake. Sitting in wc.   LUNGS: Normal respirations, lung fields are clear to auscultation, no wheezes or rales.   HEART: Regular rhythm and rate; S1 and S2, no murmur, gallop or rub  SKIN: There is some discoloration to penis which staff report is not new, no obvious sores. There is redness to scrotum however exam is limited due to patient cooperation.   NEUROLOGIC: Confused.  EXTREMITIES: No bilateral lower extremity edema.   PSYCHIATRIC: No difficult behaviors observed other than during skin exam.       ASSESSMENT / PLAN:  Patient is seen for follow up of behaviors.     1. Late onset Alzheimer's disease with behavioral disturbance    2. Multiple falls    3. Declining mobility    4. Idiopathic chronic gout of multiple sites without tophus    5. Insomnia, unspecified type    6. ACP (advance care planning)    7. Heart murmur    - patient is appropriate for Hospice services due to end-stage dementia and general decline and limited life expectancy. Hospice already contacted.   - he appears comfortable at this time, no signs of agitation, aggression, or restlessness.   - lotrimen cream to omar rash.  - continue  present medications and supportive care with goal being comfort measures only per family's wishes.     Rosalba Ruiz, CNP

## 2019-07-20 PROBLEM — J40 BRONCHITIS: Status: ACTIVE | Noted: 2019-01-01

## 2019-07-22 NOTE — TELEPHONE ENCOUNTER
Refill request for colchicine 0.6 mg tablet from [facility]  Refilled on 7/20/19 for #20 and 3 refills  Request refused.  Unable to complete prescription refill per RNMedication Refill Policy.................... Mehnaz Amato ....................  7/22/2019   3:46 PM

## 2019-07-25 NOTE — TELEPHONE ENCOUNTER
Hospice wanting to let PCP know that patient passed away at 0700 this morning.  Looks like patient has seen both Dr. Garcia and Joseph.   Anisa Rock LPN........................7/25/2019  8:39 AM

## (undated) RX ORDER — IPRATROPIUM BROMIDE AND ALBUTEROL SULFATE 2.5; .5 MG/3ML; MG/3ML
SOLUTION RESPIRATORY (INHALATION)
Status: DISPENSED
Start: 2019-01-01

## (undated) RX ORDER — ACETAMINOPHEN 325 MG/1
TABLET ORAL
Status: DISPENSED
Start: 2019-01-01